# Patient Record
Sex: MALE | Race: WHITE | Employment: OTHER | URBAN - METROPOLITAN AREA
[De-identification: names, ages, dates, MRNs, and addresses within clinical notes are randomized per-mention and may not be internally consistent; named-entity substitution may affect disease eponyms.]

---

## 2017-01-28 ENCOUNTER — APPOINTMENT (OUTPATIENT)
Dept: CT IMAGING | Age: 82
End: 2017-01-28
Attending: EMERGENCY MEDICINE
Payer: MEDICARE

## 2017-01-28 ENCOUNTER — APPOINTMENT (OUTPATIENT)
Dept: GENERAL RADIOLOGY | Age: 82
End: 2017-01-28
Attending: EMERGENCY MEDICINE
Payer: MEDICARE

## 2017-01-28 ENCOUNTER — HOSPITAL ENCOUNTER (OUTPATIENT)
Age: 82
Setting detail: OBSERVATION
Discharge: NURSING, REHAB OR LONG-TERM CARE | End: 2017-01-30
Attending: EMERGENCY MEDICINE | Admitting: INTERNAL MEDICINE
Payer: MEDICARE

## 2017-01-28 DIAGNOSIS — K52.9 GASTROENTERITIS: ICD-10-CM

## 2017-01-28 DIAGNOSIS — N18.9 ACUTE ON CHRONIC RENAL INSUFFICIENCY: Primary | ICD-10-CM

## 2017-01-28 DIAGNOSIS — N28.9 ACUTE ON CHRONIC RENAL INSUFFICIENCY: Primary | ICD-10-CM

## 2017-01-28 DIAGNOSIS — R05.9 COUGH: ICD-10-CM

## 2017-01-28 LAB
ALBUMIN SERPL BCP-MCNC: 3.1 G/DL (ref 3.5–5)
ALBUMIN/GLOB SERPL: 0.7 {RATIO} (ref 1.1–2.2)
ALP SERPL-CCNC: 76 U/L (ref 45–117)
ALT SERPL-CCNC: 28 U/L (ref 12–78)
ANION GAP BLD CALC-SCNC: 8 MMOL/L (ref 5–15)
AST SERPL W P-5'-P-CCNC: 20 U/L (ref 15–37)
BASOPHILS # BLD AUTO: 0 K/UL (ref 0–0.1)
BASOPHILS # BLD: 0 % (ref 0–1)
BILIRUB SERPL-MCNC: 0.4 MG/DL (ref 0.2–1)
BUN SERPL-MCNC: 47 MG/DL (ref 6–20)
BUN/CREAT SERPL: 31 (ref 12–20)
CALCIUM SERPL-MCNC: 9.4 MG/DL (ref 8.5–10.1)
CHLORIDE SERPL-SCNC: 104 MMOL/L (ref 97–108)
CO2 SERPL-SCNC: 28 MMOL/L (ref 21–32)
CREAT SERPL-MCNC: 1.52 MG/DL (ref 0.7–1.3)
DIFFERENTIAL METHOD BLD: ABNORMAL
EOSINOPHIL # BLD: 0.8 K/UL (ref 0–0.4)
EOSINOPHIL NFR BLD: 6 % (ref 0–7)
ERYTHROCYTE [DISTWIDTH] IN BLOOD BY AUTOMATED COUNT: 15.3 % (ref 11.5–14.5)
GLOBULIN SER CALC-MCNC: 4.5 G/DL (ref 2–4)
GLUCOSE SERPL-MCNC: 113 MG/DL (ref 65–100)
HCT VFR BLD AUTO: 35.2 % (ref 36.6–50.3)
HGB BLD-MCNC: 11.2 G/DL (ref 12.1–17)
LIPASE SERPL-CCNC: 95 U/L (ref 73–393)
LYMPHOCYTES # BLD AUTO: 4 % (ref 12–49)
LYMPHOCYTES # BLD: 0.6 K/UL (ref 0.8–3.5)
MCH RBC QN AUTO: 30.9 PG (ref 26–34)
MCHC RBC AUTO-ENTMCNC: 31.8 G/DL (ref 30–36.5)
MCV RBC AUTO: 97.2 FL (ref 80–99)
MONOCYTES # BLD: 1.5 K/UL (ref 0–1)
MONOCYTES NFR BLD AUTO: 11 % (ref 5–13)
NEUTS SEG # BLD: 11 K/UL (ref 1.8–8)
NEUTS SEG NFR BLD AUTO: 79 % (ref 32–75)
PLATELET # BLD AUTO: 232 K/UL (ref 150–400)
POTASSIUM SERPL-SCNC: 3.6 MMOL/L (ref 3.5–5.1)
PROT SERPL-MCNC: 7.6 G/DL (ref 6.4–8.2)
RBC # BLD AUTO: 3.62 M/UL (ref 4.1–5.7)
RBC MORPH BLD: ABNORMAL
RBC MORPH BLD: ABNORMAL
SODIUM SERPL-SCNC: 140 MMOL/L (ref 136–145)
TROPONIN I SERPL-MCNC: <0.04 NG/ML
WBC # BLD AUTO: 13.9 K/UL (ref 4.1–11.1)

## 2017-01-28 PROCEDURE — 96374 THER/PROPH/DIAG INJ IV PUSH: CPT

## 2017-01-28 PROCEDURE — 36415 COLL VENOUS BLD VENIPUNCTURE: CPT | Performed by: EMERGENCY MEDICINE

## 2017-01-28 PROCEDURE — 71010 XR CHEST PORT: CPT

## 2017-01-28 PROCEDURE — 74011000258 HC RX REV CODE- 258: Performed by: EMERGENCY MEDICINE

## 2017-01-28 PROCEDURE — 74011250636 HC RX REV CODE- 250/636: Performed by: EMERGENCY MEDICINE

## 2017-01-28 PROCEDURE — 96375 TX/PRO/DX INJ NEW DRUG ADDON: CPT

## 2017-01-28 PROCEDURE — 80053 COMPREHEN METABOLIC PANEL: CPT | Performed by: EMERGENCY MEDICINE

## 2017-01-28 PROCEDURE — 85025 COMPLETE CBC W/AUTO DIFF WBC: CPT | Performed by: EMERGENCY MEDICINE

## 2017-01-28 PROCEDURE — 84484 ASSAY OF TROPONIN QUANT: CPT | Performed by: EMERGENCY MEDICINE

## 2017-01-28 PROCEDURE — 96361 HYDRATE IV INFUSION ADD-ON: CPT

## 2017-01-28 PROCEDURE — 83605 ASSAY OF LACTIC ACID: CPT | Performed by: EMERGENCY MEDICINE

## 2017-01-28 PROCEDURE — 93005 ELECTROCARDIOGRAM TRACING: CPT

## 2017-01-28 PROCEDURE — 83690 ASSAY OF LIPASE: CPT | Performed by: EMERGENCY MEDICINE

## 2017-01-28 PROCEDURE — 96376 TX/PRO/DX INJ SAME DRUG ADON: CPT

## 2017-01-28 PROCEDURE — 99285 EMERGENCY DEPT VISIT HI MDM: CPT

## 2017-01-28 PROCEDURE — 74177 CT ABD & PELVIS W/CONTRAST: CPT

## 2017-01-28 PROCEDURE — 74011636320 HC RX REV CODE- 636/320: Performed by: EMERGENCY MEDICINE

## 2017-01-28 RX ORDER — ONDANSETRON 2 MG/ML
4 INJECTION INTRAMUSCULAR; INTRAVENOUS
Status: COMPLETED | OUTPATIENT
Start: 2017-01-28 | End: 2017-01-28

## 2017-01-28 RX ORDER — SODIUM CHLORIDE 0.9 % (FLUSH) 0.9 %
10 SYRINGE (ML) INJECTION
Status: COMPLETED | OUTPATIENT
Start: 2017-01-28 | End: 2017-01-28

## 2017-01-28 RX ORDER — MORPHINE SULFATE 4 MG/ML
2 INJECTION, SOLUTION INTRAMUSCULAR; INTRAVENOUS ONCE
Status: COMPLETED | OUTPATIENT
Start: 2017-01-28 | End: 2017-01-28

## 2017-01-28 RX ADMIN — Medication 2 MG: at 23:17

## 2017-01-28 RX ADMIN — SODIUM CHLORIDE 500 ML: 900 INJECTION, SOLUTION INTRAVENOUS at 23:17

## 2017-01-28 RX ADMIN — Medication 10 ML: at 23:25

## 2017-01-28 RX ADMIN — SODIUM CHLORIDE 500 ML: 900 INJECTION, SOLUTION INTRAVENOUS at 22:34

## 2017-01-28 RX ADMIN — SODIUM CHLORIDE 100 ML: 900 INJECTION, SOLUTION INTRAVENOUS at 23:25

## 2017-01-28 RX ADMIN — ONDANSETRON 4 MG: 2 INJECTION INTRAMUSCULAR; INTRAVENOUS at 22:34

## 2017-01-28 RX ADMIN — IOPAMIDOL 100 ML: 755 INJECTION, SOLUTION INTRAVENOUS at 23:25

## 2017-01-28 NOTE — IP AVS SNAPSHOT
[address] [address] 
[phone] Patient: Stan Crenshaw MRN: [id number] :1918 You are allergic to the following Allergen Reactions Ciprofloxacin Nausea Only Penicillins Unknown (comments) Sulfa (Sulfonamide Antibiotics) Rash Vancomycin Rash Swelling Eyes became reddened and swollen Recent Documentation Height Weight BMI Smoking Status 1.753 m 62.5 kg 20.35 kg/m2 Former Smoker Emergency Contacts Name Discharge Info Relation Home Work Mobile Claudette Rhea  Child [2] [phone] Alex Salazar  Child [2] [phone] Astrid Salazar  Other Relative [6] [phone] About your hospitalization You were admitted on:  2017 You last received care in the:  [facility] 6S NEUR[employer] TELE You were discharged on:  2017 Unit phone number:  [phone] Why you were hospitalized Your primary diagnosis was:  Gastroenteritis Providers Seen During Your Hospitalizations Provider Role Specialty Primary office phone Eren Landers MD Attending Provider Emergency Medicine [phone] Robina Carvajal MD Attending Provider Internal Medicine [phone] Niels Miranda MD Attending Provider Internal Medicine [phone] Charlotte Cuellar MD Attending Provider Internal Medicine [phone] Your Primary Care Physician (PCP) Primary Care Physician Office Phone Office Fax Fidelina Pena 1394 [phone] Follow-up Information Follow up With Details Comments Contact Info Kelly German MD  post hospitalization in 1 week [address] [id number] 
[phone] [employer] (VETERANS ONLY)   [address] [facility] [id number] [phone] Current Discharge Medication List  
  
CONTINUE these medications which have NOT CHANGED Dose & Instructions Dispensing Information Comments Morning Noon Evening Bedtime  
 ascorbic acid (vitamin C) 500 mg tablet Commonly known as:  VITAMIN C Your next dose is: Today, Tomorrow Other:  _________ Dose:  500 mg Take 500 mg by mouth. Refills:  0  
     
   
   
   
  
 CEROVITE SENIOR Tab tablet Generic drug:  multivitamins-minerals-lutein Your next dose is: Today, Tomorrow Other:  _________ TAKE 1 TABLET BY MOUTH EVERY MORNING Quantity:  90 tablet Refills:  4  
     
   
   
   
  
 clopidogrel 75 mg Tab Commonly known as:  PLAVIX Your next dose is: Today, Tomorrow Other:  _________ Dose:  75 mg Take 1 Tab by mouth daily for 360 days. Quantity:  30 Tab Refills:  11  
     
   
   
   
  
 donepezil 10 mg tablet Commonly known as:  ARICEPT Your next dose is: Today, Tomorrow Other:  _________ TAKE 1 TABLET BY MOUTH DAILY AT BEDTIME Quantity:  90 Tab Refills:  1  
     
   
   
   
  
 ferrous sulfate 325 mg (65 mg iron) tablet Your next dose is: Today, Tomorrow Other:  _________ TAKE 1 TAB BY MOUTH TWO (2) TIMES A DAY. Quantity:  180 Tab Refills:  1  
     
   
   
   
  
 guaiFENesin-codeine 100-10 mg/5 mL solution Commonly known as:  ROBITUSSIN AC Your next dose is: Today, Tomorrow Other:  _________ Dose:  10 mL Take 10 mL by mouth three (3) times daily as needed for Cough. Refills:  0  
     
   
   
   
  
 levothyroxine 88 mcg tablet Commonly known as:  SYNTHROID Your next dose is: Today, Tomorrow Other:  _________ TAKE 1 TABLET BY MOUTH EVERY MORNING BEFORE BREAKFAST OR OTHER MEDICATIONS DX:THYROID Quantity:  90 Tab Refills:  1  
     
   
   
   
  
 loratadine 10 mg Cap Your next dose is: Today, Tomorrow Other:  _________ Dose:  10 mg Take 10 mg by mouth daily. Refills:  0  
     
   
   
   
  
 nystatin 100,000 unit/gram ointment Commonly known as:  MYCOSTATIN Your next dose is: Today, Tomorrow Other:  _________ Apply  to affected area two (2) times a day. Refills:  0  
     
   
   
   
  
 pantoprazole 20 mg tablet Commonly known as:  PROTONIX Your next dose is: Today, Tomorrow Other:  _________ Dose:  20 mg Take 20 mg by mouth daily. Refills:  0 ZAZUETA MILK OF MAGNESIA 400 mg/5 mL suspension Generic drug:  magnesium hydroxide Your next dose is: Today, Tomorrow Other:  _________ Dose:  20 mL Take 20 mL by mouth daily as needed for Constipation. Refills:  0  
     
   
   
   
  
 promethazine 25 mg tablet Commonly known as:  PHENERGAN Your next dose is: Today, Tomorrow Other:  _________ Dose:  25 mg Take 25 mg by mouth every six (6) hours as needed for Nausea. Refills:  0 REMERON 15 mg tablet Generic drug:  mirtazapine Your next dose is: Today, Tomorrow Other:  _________ Dose:  15 mg Take 15 mg by mouth nightly. Refills:  0  
     
   
   
   
  
 sertraline 50 mg tablet Commonly known as:  ZOLOFT Your next dose is: Today, Tomorrow Other:  _________ Dose:  50 mg Take 50 mg by mouth daily. Refills:  0  
     
   
   
   
  
 simvastatin 20 mg tablet Commonly known as:  ZOCOR Your next dose is: Today, Tomorrow Other:  _________ Dose:  20 mg Take 20 mg by mouth nightly. Refills:  0  
     
   
   
   
  
 tamsulosin 0.4 mg capsule Commonly known as:  FLOMAX Your next dose is: Today, Tomorrow Other:  _________ Dose:  0.4 mg Take 0.4 mg by mouth daily. Refills:  0 traMADol 50 mg tablet Commonly known as:  ULTRAM  
   
Your next dose is: Today, Tomorrow Other:  _________ Dose:  50 mg Take 50 mg by mouth two (2) times daily as needed for Pain. Refills:  0  
     
   
   
   
  
 triamcinolone 0.5 % topical cream  
Commonly known as:  ARISTOCORT Your next dose is: Today, Tomorrow Other:  _________ Apply  to affected area two (2) times daily as needed for Skin Irritation. use thin layer Refills:  0  
     
   
   
   
  
 TYLENOL EXTRA STRENGTH 500 mg tablet Generic drug:  acetaminophen Your next dose is: Today, Tomorrow Other:  _________ Take  by mouth every six (6) hours as needed for Pain. Refills:  0 STOP taking these medications diphenoxylate-atropine 2.5-0.025 mg per tablet Commonly known as:  LOMOTIL Discharge Instructions Discharge Summary  
  
  
PATIENT ID: Herminio Tejeda MRN: [id number] YOB: 1918 DATE OF ADMISSION: 1/28/2017 9:29 PM   
DATE OF DISCHARGE: 1/30/2017 PRIMARY CARE PROVIDER: Lauryn Kat MD  
  
ATTENDING PHYSICIAN: Claudia Nielson MD 
DISCHARGING PROVIDER: Aida Carson NP To contact this individual call [phone] and ask the  to page. If unavailable ask to be transferred the Adult Hospitalist Department. 
  
CONSULTATIONS: IP CONSULT TO CARDIOLOGY 
  
PROCEDURES/SURGERIES: * No surgery found * 
  
ADMITTING [address]:  
Per H&P Mr. Jannette Guillen is a 80 y.o.  male who is admitted with acute gastroenteritis. Mr. Jannette Guillen was sent from the NH to the Emergency Department complaining of nausea and vomiting and diarrhea . History from the chart and ER physician, currently sleeping and not willing to provide history.  He had 3 vomiting in the last 6 hours prior to arrival and had x2 diarrhea . He also complains of body aches and having left sided CP since yesterday with associated SOB .  
  
1/30/2017 VSS, offers no complaints, denies abd pain, N/V/D. Did not have any diarrhea during hospital stay to send off studies. Pt was adequately hydrated, eating/drinking w/o problems. Spoke with pt's son, Rosamaria Layne, aware of d/c back to NH today. No need for abx.  
  
DISCHARGE DIAGNOSES / PLAN:   
  
Acute Gastroenteritis - Resolved - CT Abdomen and Pelvis: Nonobstructing right renal stone. No acute abnormality - CXR : No acute finding -IVF  
-PRN IV Antiemetic  
  
Lt side Chest pain  
- H/O CAD /Stent, he is paced on telemetry - Continue Plavix and Statin - Serial Troponin negative x2 
- evaluated by Cardiology => Would not pursue any further cardiac work-up and favor fairly conservative measures in general with this elderly male. 
   
Dehydration Lokesh Valencia : Resolved - IV Hydration  
   
Dementia - Supportive care  
  
  
PENDING TEST RESULTS:  
At the time of discharge the following test results are still pending: none 
  
FOLLOW UP APPOINTMENTS:  
Follow-up Information Follow up With Details Comments Contact Info  
  Iker Mejia MD   post hospitalization in 1 week [address] [place] [address] Way [address] [phone]  
  
  
  
ADDITIONAL CARE RECOMMENDATIONS: as above 
  
DIET: Puree diet with honey thick liquids, supplement with fortified pudding and magic cups with meals 
  
ACTIVITY: activity as tolerated 
  
WOUND CARE: none 
  
EQUIPMENT needed: none 
  
  
DISCHARGE MEDICATIONS: 
     
Current Discharge Medication List  
   
     
CONTINUE these medications which have NOT CHANGED  
  Details  
simvastatin (ZOCOR) 20 mg tablet Take 20 mg by mouth nightly.  
   
triamcinolone (ARISTOCORT) 0.5 % topical cream Apply to affected area two (2) times daily as needed for Skin Irritation.  use thin layer  
   
 levothyroxine (SYNTHROID) 88 mcg tablet TAKE 1 TABLET BY MOUTH EVERY MORNING BEFORE BREAKFAST OR OTHER MEDICATIONS DX:THYROID Qty: 90 Tab, Refills: 1  
   
ferrous sulfate 325 mg (65 mg iron) tablet TAKE 1 TAB BY MOUTH TWO (2) TIMES A DAY. Qty: 180 Tab, Refills: 1  
   
CEROVITE SENIOR tab tablet TAKE 1 TABLET BY MOUTH EVERY MORNING Qty: 90 tablet, Refills: 4  
   
clopidogrel (PLAVIX) 75 mg tablet Take 1 Tab by mouth daily for 360 days. Qty: 30 Tab, Refills: 11  
   
nystatin (MYCOSTATIN) 100,000 unit/gram ointment Apply to affected area two (2) times a day.  
   
acetaminophen (TYLENOL EXTRA STRENGTH) 500 mg tablet Take by mouth every six (6) hours as needed for Pain.  
   
ascorbic acid, vitamin C, (VITAMIN C) 500 mg tablet Take 500 mg by mouth.  
   
mirtazapine (REMERON) 15 mg tablet Take 15 mg by mouth nightly.  
   
promethazine (PHENERGAN) 25 mg tablet Take 25 mg by mouth every six (6) hours as needed for Nausea.  
   
guaiFENesin-codeine (ROBITUSSIN AC) 100-10 mg/5 mL solution Take 10 mL by mouth three (3) times daily as needed for Cough.  
   
magnesium hydroxide (ZAZUETA MILK OF MAGNESIA) 400 mg/5 mL suspension Take 20 mL by mouth daily as needed for Constipation.  
   
loratadine 10 mg cap Take 10 mg by mouth daily.  
   
pantoprazole (PROTONIX) 20 mg tablet Take 20 mg by mouth daily.  
   
traMADol (ULTRAM) 50 mg tablet Take 50 mg by mouth two (2) times daily as needed for Pain.  
   
tamsulosin (FLOMAX) 0.4 mg capsule Take 0.4 mg by mouth daily.  
   
sertraline (ZOLOFT) 50 mg tablet Take 50 mg by mouth daily.  
   
donepezil (ARICEPT) 10 mg tablet TAKE 1 TABLET BY MOUTH DAILY AT BEDTIME Qty: 90 Tab, Refills: 1  
   
   
    
STOP taking these medications  
   
  diphenoxylate-atropine (LOMOTIL) 2.5-0.025 mg per tablet Comments:  
Reason for Stopping:   
     
   
  
  
  
NOTIFY YOUR PHYSICIAN FOR ANY OF THE FOLLOWING:  
Fever over 101 degrees for 24 hours. Chest pain, shortness of breath, fever, chills, nausea, vomiting, diarrhea, change in mentation, falling, weakness, bleeding. Severe pain or pain not relieved by medications. Or, any other signs or symptoms that you may have questions about. 
  
DISPOSITION: 
   Home With: 
  OT   PT   HH   RN  
  
xx Long term SNF/Inpatient Rehab  
  Independent/assisted living  
  Hospice  
  Other:  
  
  
PATIENT CONDITION AT DISCHARGE:  
  
Functional status  
  Poor   
xx Deconditioned   
  Independent   
  
Cognition  
   Lucid   
  Forgetful   
xx Dementia   
  
Catheters/lines (plus indication)  
  George   
  PICC   
  PEG   
xx None   
  
Code status  
xx Full code   
  DNR   
  
PHYSICAL EXAMINATION AT DISCHARGE: 
Constitutional: No acute distress, pleasant   
ENT: Oral mucous moist, oropharynx benign. Neck supple Resp: CTA bilaterally. No wheezing/rhonchi/rales. No accessory muscle use CV: Regular rhythm, normal rate, no murmurs, gallops, rubs GI: Soft, non distended, non tender. normoactive bowel sounds, no hepatosplenomegaly Musculoskeletal: No edema, warm, 2+ pulses throughout Neurologic: Moves all extremities. AA&Ox2 Psych: no anxietyagitation  
  
     
     
     
     
     
     
     
CHRONIC MEDICAL DIAGNOSES: 
Problem List as of 1/30/2017  Date Reviewed: 1/30/2017  
          Codes Class Noted - Resolved  
  UTI (lower urinary tract infection) ICD-10-CM: N39.0 ICD-9-CM: 599.0   4/11/2015 - Present  
     
  Encephalopathy ICD-10-CM: G93.40 ICD-9-CM: 348.30   4/11/2015 - Present  
     
  Altered mental status ICD-10-CM: R41.82 
ICD-9-CM: 780.97   11/19/2014 - Present  
     
  Mild cognitive impairment ICD-10-CM: G31.84 ICD-9-CM: 331.83   12/12/2013 - Present  
     
  Chronic diarrhea ICD-10-CM: K52.9 ICD-9-CM: 787.91   6/18/2012 - Present  
     
  Hypertension ICD-10-CM: I10 
ICD-9-CM: 560. 9   2/1/2012 - Present  
     
  Hypothyroidism ICD-10-CM: E03.9 ICD-9-CM: 263. 9   8/23/2011 - Present  
     
  GERD (gastroesophageal reflux disease) ICD-10-CM: K21.9 ICD-9-CM: 530.81   7/10/2011 - Present  
     
  Other and unspecified hyperlipidemia ICD-10-CM: E78.5 ICD-9-CM: 272.4   3/29/2011 - Present  
     
  BPH (benign prostatic hypertrophy) ICD-10-CM: N40.0 ICD-9-CM: 600.00   3/29/2011 - Present  
     
  Depression ICD-10-CM: F32.9 ICD-9-CM: 311   3/29/2011 - Present  
     
  Sciatica ICD-10-CM: M54.30 ICD-9-CM: 733. 3   3/29/2011 - Present  
     
  Sleep apnea ICD-10-CM: G47.30 ICD-9-CM: 780.57   3/29/2011 - Present  
     
  Eczema ICD-10-CM: L30.9 ICD-9-CM: 692.9   3/29/2011 - Present  
     
  BCC (basal cell carcinoma of skin) ICD-10-CM: C44.91 
ICD-9-CM: 173.91   3/29/2011 - Present  
     
  Vitamin D deficiency ICD-10-CM: E55.9 ICD-9-CM: 754. 9   3/29/2011 - Present  
     
  Osteoporosis ICD-10-CM: M81.0 ICD-9-CM: 733.00   3/29/2011 - Present  
     
  Restless leg syndrome ICD-10-CM: G25.81 ICD-9-CM: 333.94   3/29/2011 - Present  
     
  DVT (deep venous thrombosis) (HCC) ICD-10-CM: I82.409 ICD-9-CM: 453.40   3/29/2011 - Present  
     
  CAD (coronary artery disease) ICD-10-CM: I25.10 ICD-9-CM: 414.00   12/30/2010 - Present  
     
  Dementia ICD-10-CM: F03.90 ICD-9-CM: 294.20   12/30/2010 - Present  
     
  * (Principal)RESOLVED: Gastroenteritis ICD-10-CM: K52.9 ICD-9-CM: 558. 9   1/29/2017 - 1/30/2017  
     
  RESOLVED: Acute encephalopathy ICD-10-CM: G93.40 ICD-9-CM: 348.30   12/9/2016 - 12/13/2016  
     
  RESOLVED: Hypotension ICD-10-CM: I95.9 ICD-9-CM: 377. 9   5/11/2013 - 3/27/2014  
     
  RESOLVED: Dementia ICD-10-CM: F03.90 ICD-9-CM: 294.20   3/29/2011 - 7/28/2012  
     
  
  
  
Greater than 35 minutes were spent with the patient on counseling and coordination of care 
  
Signed:  
Susu Luo NP 
1/30/2017 
1:42 PM 
 
 
Discharge Orders None [facility] Announcement We are excited to announce that we are making your provider's discharge notes available to you in [facility]. You will see these notes when they are completed and signed by the physician that discharged you from your recent hospital stay. If you have any questions or concerns about any information you see in [facility], please call the Health Information Department where you were seen or reach out to your Primary Care Provider for more information about your plan of care. Introducing [facility] & HEALTH SERVICES! Dear Angelique Engel: Thank you for requesting a [facility] account. Our records indicate that you already have an active [facility] account. You can access your account anytime at [email or web address] [place]. [employer] [place] Did you know that you can access your hospital and ER discharge instructions at any time in [facility]? You can also review all of your test results from your hospital stay or ER visit. Additional Information If you have questions, please visit the Frequently Asked Questions section of the [facility] website at [email or web address]. [employer] [place]/. Remember, [facility] is NOT to be used for urgent needs. For medical emergencies, dial 911. Now available from your iPhone and Android! General Information Please provide this summary of care documentation to your next provider. Patient Signature:  ____________________________________________________________ Date:  ____________________________________________________________  
  
sAhely Delacruzer Provider Signature:  ____________________________________________________________ Date:  ____________________________________________________________

## 2017-01-28 NOTE — IP AVS SNAPSHOT
Current Discharge Medication List  
  
Take these medications at their scheduled times Dose & Instructions Dispensing Information Comments Morning Noon Evening Bedtime  
 clopidogrel 75 mg Tab Commonly known as:  PLAVIX Your next dose is: Today, Tomorrow Other:  ____________ Dose:  75 mg Take 1 Tab by mouth daily for 360 days. Quantity:  30 Tab Refills:  11  
     
   
   
   
  
 loratadine 10 mg Cap Your next dose is: Today, Tomorrow Other:  ____________ Dose:  10 mg Take 10 mg by mouth daily. Refills:  0  
     
   
   
   
  
 nystatin 100,000 unit/gram ointment Commonly known as:  MYCOSTATIN Your next dose is: Today, Tomorrow Other:  ____________ Apply  to affected area two (2) times a day. Refills:  0  
     
   
   
   
  
 pantoprazole 20 mg tablet Commonly known as:  PROTONIX Your next dose is: Today, Tomorrow Other:  ____________ Dose:  20 mg Take 20 mg by mouth daily. Refills:  0 REMERON 15 mg tablet Generic drug:  mirtazapine Your next dose is: Today, Tomorrow Other:  ____________ Dose:  15 mg Take 15 mg by mouth nightly. Refills:  0  
     
   
   
   
  
 sertraline 50 mg tablet Commonly known as:  ZOLOFT Your next dose is: Today, Tomorrow Other:  ____________ Dose:  50 mg Take 50 mg by mouth daily. Refills:  0  
     
   
   
   
  
 simvastatin 20 mg tablet Commonly known as:  ZOCOR Your next dose is: Today, Tomorrow Other:  ____________ Dose:  20 mg Take 20 mg by mouth nightly. Refills:  0  
     
   
   
   
  
 tamsulosin 0.4 mg capsule Commonly known as:  FLOMAX Your next dose is: Today, Tomorrow Other:  ____________ Dose:  0.4 mg Take 0.4 mg by mouth daily. Refills:  0 Take these medications as needed Dose & Instructions Dispensing Information Comments Morning Noon Evening Bedtime  
 guaiFENesin-codeine 100-10 mg/5 mL solution Commonly known as:  ROBITUSSIN AC Your next dose is: Today, Tomorrow Other:  ____________ Dose:  10 mL Take 10 mL by mouth three (3) times daily as needed for Cough. Refills:  0 ZAZUETA MILK OF MAGNESIA 400 mg/5 mL suspension Generic drug:  magnesium hydroxide Your next dose is: Today, Tomorrow Other:  ____________ Dose:  20 mL Take 20 mL by mouth daily as needed for Constipation. Refills:  0  
     
   
   
   
  
 promethazine 25 mg tablet Commonly known as:  PHENERGAN Your next dose is: Today, Tomorrow Other:  ____________ Dose:  25 mg Take 25 mg by mouth every six (6) hours as needed for Nausea. Refills:  0  
     
   
   
   
  
 traMADol 50 mg tablet Commonly known as:  ULTRAM  
   
Your next dose is: Today, Tomorrow Other:  ____________ Dose:  50 mg Take 50 mg by mouth two (2) times daily as needed for Pain. Refills:  0  
     
   
   
   
  
 triamcinolone 0.5 % topical cream  
Commonly known as:  ARISTOCORT Your next dose is: Today, Tomorrow Other:  ____________ Apply  to affected area two (2) times daily as needed for Skin Irritation. use thin layer Refills:  0  
     
   
   
   
  
 TYLENOL EXTRA STRENGTH 500 mg tablet Generic drug:  acetaminophen Your next dose is: Today, Tomorrow Other:  ____________ Take  by mouth every six (6) hours as needed for Pain. Refills:  0 Take these medications as directed Dose & Instructions Dispensing Information Comments Morning Noon Evening Bedtime  
 ascorbic acid (vitamin C) 500 mg tablet Commonly known as:  VITAMIN C Your next dose is: Today, Tomorrow Other:  ____________ Dose:  500 mg Take 500 mg by mouth. Refills:  0  
     
   
   
   
  
 CEROVITE SENIOR Tab tablet Generic drug:  multivitamins-minerals-lutein Your next dose is: Today, Tomorrow Other:  ____________ TAKE 1 TABLET BY MOUTH EVERY MORNING Quantity:  90 tablet Refills:  4  
     
   
   
   
  
 donepezil 10 mg tablet Commonly known as:  ARICEPT Your next dose is: Today, Tomorrow Other:  ____________ TAKE 1 TABLET BY MOUTH DAILY AT BEDTIME Quantity:  90 Tab Refills:  1  
     
   
   
   
  
 ferrous sulfate 325 mg (65 mg iron) tablet Your next dose is: Today, Tomorrow Other:  ____________ TAKE 1 TAB BY MOUTH TWO (2) TIMES A DAY. Quantity:  180 Tab Refills:  1  
     
   
   
   
  
 levothyroxine 88 mcg tablet Commonly known as:  SYNTHROID Your next dose is: Today, Tomorrow Other:  ____________ TAKE 1 TABLET BY MOUTH EVERY MORNING BEFORE BREAKFAST OR OTHER MEDICATIONS DX:THYROID Quantity:  90 Tab Refills:  1

## 2017-01-29 PROBLEM — K52.9 GASTROENTERITIS: Status: ACTIVE | Noted: 2017-01-29

## 2017-01-29 LAB
APPEARANCE UR: CLEAR
ATRIAL RATE: 94 BPM
BACTERIA URNS QL MICRO: NEGATIVE /HPF
BILIRUB UR QL: NEGATIVE
CALCULATED P AXIS, ECG09: 30 DEGREES
CALCULATED R AXIS, ECG10: -69 DEGREES
CALCULATED T AXIS, ECG11: 96 DEGREES
CAOX CRY URNS QL MICRO: ABNORMAL
COLOR UR: ABNORMAL
DIAGNOSIS, 93000: NORMAL
EPITH CASTS URNS QL MICRO: ABNORMAL /LPF
GLUCOSE UR STRIP.AUTO-MCNC: NEGATIVE MG/DL
HGB UR QL STRIP: ABNORMAL
HYALINE CASTS URNS QL MICRO: ABNORMAL /LPF (ref 0–5)
KETONES UR QL STRIP.AUTO: NEGATIVE MG/DL
LACTATE SERPL-SCNC: 1.2 MMOL/L (ref 0.4–2)
LEUKOCYTE ESTERASE UR QL STRIP.AUTO: ABNORMAL
NITRITE UR QL STRIP.AUTO: NEGATIVE
P-R INTERVAL, ECG05: 156 MS
PH UR STRIP: 5.5 [PH] (ref 5–8)
PROT UR STRIP-MCNC: 30 MG/DL
Q-T INTERVAL, ECG07: 438 MS
QRS DURATION, ECG06: 164 MS
QTC CALCULATION (BEZET), ECG08: 547 MS
RBC #/AREA URNS HPF: ABNORMAL /HPF (ref 0–5)
SP GR UR REFRACTOMETRY: 1.03 (ref 1–1.03)
TROPONIN I SERPL-MCNC: <0.04 NG/ML
UA: UC IF INDICATED,UAUC: ABNORMAL
UROBILINOGEN UR QL STRIP.AUTO: 0.2 EU/DL (ref 0.2–1)
VENTRICULAR RATE, ECG03: 94 BPM
WBC URNS QL MICRO: ABNORMAL /HPF (ref 0–4)

## 2017-01-29 PROCEDURE — 99218 HC RM OBSERVATION: CPT

## 2017-01-29 PROCEDURE — 74011250637 HC RX REV CODE- 250/637: Performed by: INTERNAL MEDICINE

## 2017-01-29 PROCEDURE — 96365 THER/PROPH/DIAG IV INF INIT: CPT

## 2017-01-29 PROCEDURE — 96366 THER/PROPH/DIAG IV INF ADDON: CPT

## 2017-01-29 PROCEDURE — 81001 URINALYSIS AUTO W/SCOPE: CPT | Performed by: EMERGENCY MEDICINE

## 2017-01-29 PROCEDURE — 74011000250 HC RX REV CODE- 250: Performed by: INTERNAL MEDICINE

## 2017-01-29 PROCEDURE — 74011250636 HC RX REV CODE- 250/636: Performed by: INTERNAL MEDICINE

## 2017-01-29 PROCEDURE — 74011250636 HC RX REV CODE- 250/636: Performed by: EMERGENCY MEDICINE

## 2017-01-29 PROCEDURE — 96361 HYDRATE IV INFUSION ADD-ON: CPT

## 2017-01-29 PROCEDURE — 36415 COLL VENOUS BLD VENIPUNCTURE: CPT | Performed by: INTERNAL MEDICINE

## 2017-01-29 PROCEDURE — 84484 ASSAY OF TROPONIN QUANT: CPT | Performed by: INTERNAL MEDICINE

## 2017-01-29 PROCEDURE — 77010033678 HC OXYGEN DAILY

## 2017-01-29 PROCEDURE — 77030011943

## 2017-01-29 PROCEDURE — 96372 THER/PROPH/DIAG INJ SC/IM: CPT

## 2017-01-29 RX ORDER — DONEPEZIL HYDROCHLORIDE 10 MG/1
10 TABLET, FILM COATED ORAL
Status: CANCELLED | OUTPATIENT
Start: 2017-01-29

## 2017-01-29 RX ORDER — NYSTATIN 100000 U/G
OINTMENT TOPICAL 2 TIMES DAILY
COMMUNITY

## 2017-01-29 RX ORDER — ONDANSETRON 2 MG/ML
4 INJECTION INTRAMUSCULAR; INTRAVENOUS
Status: COMPLETED | OUTPATIENT
Start: 2017-01-29 | End: 2017-01-29

## 2017-01-29 RX ORDER — PANTOPRAZOLE SODIUM 20 MG/1
20 TABLET, DELAYED RELEASE ORAL DAILY
Status: CANCELLED | OUTPATIENT
Start: 2017-01-29

## 2017-01-29 RX ORDER — SERTRALINE HYDROCHLORIDE 50 MG/1
50 TABLET, FILM COATED ORAL DAILY
Status: CANCELLED | OUTPATIENT
Start: 2017-01-29

## 2017-01-29 RX ORDER — SODIUM CHLORIDE 0.9 % (FLUSH) 0.9 %
5-10 SYRINGE (ML) INJECTION AS NEEDED
Status: DISCONTINUED | OUTPATIENT
Start: 2017-01-29 | End: 2017-01-30 | Stop reason: HOSPADM

## 2017-01-29 RX ORDER — ONDANSETRON 2 MG/ML
4 INJECTION INTRAMUSCULAR; INTRAVENOUS
Status: CANCELLED | OUTPATIENT
Start: 2017-01-29

## 2017-01-29 RX ORDER — CLOPIDOGREL BISULFATE 75 MG/1
75 TABLET ORAL DAILY
Status: DISCONTINUED | OUTPATIENT
Start: 2017-01-29 | End: 2017-01-30 | Stop reason: HOSPADM

## 2017-01-29 RX ORDER — MIRTAZAPINE 15 MG/1
15 TABLET, FILM COATED ORAL
Status: CANCELLED | OUTPATIENT
Start: 2017-01-29

## 2017-01-29 RX ORDER — TAMSULOSIN HYDROCHLORIDE 0.4 MG/1
0.4 CAPSULE ORAL DAILY
Status: CANCELLED | OUTPATIENT
Start: 2017-01-29

## 2017-01-29 RX ORDER — SODIUM CHLORIDE 0.9 % (FLUSH) 0.9 %
5-10 SYRINGE (ML) INJECTION EVERY 8 HOURS
Status: DISCONTINUED | OUTPATIENT
Start: 2017-01-29 | End: 2017-01-30 | Stop reason: HOSPADM

## 2017-01-29 RX ORDER — METRONIDAZOLE 500 MG/100ML
500 INJECTION, SOLUTION INTRAVENOUS EVERY 8 HOURS
Status: DISCONTINUED | OUTPATIENT
Start: 2017-01-29 | End: 2017-01-30 | Stop reason: HOSPADM

## 2017-01-29 RX ORDER — SIMVASTATIN 20 MG/1
20 TABLET, FILM COATED ORAL
Status: DISCONTINUED | OUTPATIENT
Start: 2017-01-29 | End: 2017-01-30 | Stop reason: HOSPADM

## 2017-01-29 RX ORDER — HEPARIN SODIUM 5000 [USP'U]/ML
5000 INJECTION, SOLUTION INTRAVENOUS; SUBCUTANEOUS EVERY 8 HOURS
Status: DISCONTINUED | OUTPATIENT
Start: 2017-01-29 | End: 2017-01-30 | Stop reason: HOSPADM

## 2017-01-29 RX ORDER — SODIUM CHLORIDE 9 MG/ML
100 INJECTION, SOLUTION INTRAVENOUS CONTINUOUS
Status: DISCONTINUED | OUTPATIENT
Start: 2017-01-29 | End: 2017-01-30 | Stop reason: HOSPADM

## 2017-01-29 RX ORDER — ACETAMINOPHEN 500 MG
TABLET ORAL
COMMUNITY

## 2017-01-29 RX ORDER — ACETAMINOPHEN 325 MG/1
650 TABLET ORAL
Status: DISCONTINUED | OUTPATIENT
Start: 2017-01-29 | End: 2017-01-30 | Stop reason: HOSPADM

## 2017-01-29 RX ORDER — LEVOTHYROXINE SODIUM 88 UG/1
88 TABLET ORAL
Status: DISCONTINUED | OUTPATIENT
Start: 2017-01-29 | End: 2017-01-30 | Stop reason: HOSPADM

## 2017-01-29 RX ADMIN — Medication 10 ML: at 17:11

## 2017-01-29 RX ADMIN — CLOPIDOGREL BISULFATE 75 MG: 75 TABLET ORAL at 11:23

## 2017-01-29 RX ADMIN — SODIUM CHLORIDE 100 ML/HR: 900 INJECTION, SOLUTION INTRAVENOUS at 17:11

## 2017-01-29 RX ADMIN — Medication 10 ML: at 23:27

## 2017-01-29 RX ADMIN — LEVOTHYROXINE SODIUM 88 MCG: 88 TABLET ORAL at 11:23

## 2017-01-29 RX ADMIN — HEPARIN SODIUM 5000 UNITS: 5000 INJECTION, SOLUTION INTRAVENOUS; SUBCUTANEOUS at 20:28

## 2017-01-29 RX ADMIN — METRONIDAZOLE 500 MG: 500 INJECTION, SOLUTION INTRAVENOUS at 17:11

## 2017-01-29 RX ADMIN — SIMVASTATIN 20 MG: 20 TABLET, FILM COATED ORAL at 23:27

## 2017-01-29 RX ADMIN — HEPARIN SODIUM 5000 UNITS: 5000 INJECTION, SOLUTION INTRAVENOUS; SUBCUTANEOUS at 11:23

## 2017-01-29 RX ADMIN — METRONIDAZOLE 500 MG: 500 INJECTION, SOLUTION INTRAVENOUS at 11:23

## 2017-01-29 RX ADMIN — SODIUM CHLORIDE 100 ML/HR: 900 INJECTION, SOLUTION INTRAVENOUS at 04:48

## 2017-01-29 RX ADMIN — ACETAMINOPHEN 650 MG: 325 TABLET, FILM COATED ORAL at 20:22

## 2017-01-29 RX ADMIN — ONDANSETRON 4 MG: 2 INJECTION INTRAMUSCULAR; INTRAVENOUS at 01:30

## 2017-01-29 NOTE — ED NOTES
Spoke with Artist JANUARY Valdivia from [facility] to update them on pt's care and pending labs.

## 2017-01-29 NOTE — ED PROVIDER NOTES
HPI Comments: The patient presents to the ED with n/v/d and generalized pain. Symptoms began today. He has vomited a few times. No blood in vomit. He has had 2 episodes of non-bloody diarrhea. He denies any fever. He has severe nausea and \"hurts all over. \" Pain is moderate, 5/10 and aching. He denies any hematuria or dysuria. He also has mild left chest pain and shortness of breath which began yesterday. He states he has had cough \"for a while. \" No meds taken prior to arrival.     Patient is a 80 y.o. male presenting with vomiting and diarrhea. The history is provided by the patient. Vomiting    Associated symptoms include abdominal pain, diarrhea, myalgias and cough. Pertinent negatives include no fever, no headaches and no headaches. Diarrhea    Associated symptoms include diarrhea, nausea, vomiting, myalgias and chest pain. Pertinent negatives include no fever, no dysuria and no headaches. Past Medical History:   Diagnosis Date    BCC (basal cell carcinoma of skin)     BPH (benign prostatic hypertrophy)     CAD (coronary artery disease)      3 stents and 1 baloon    Dementia     Hypertension     Other and unspecified hyperlipidemia     Overactive bladder     Pacemaker     Psychiatric disorder     Sciatica        Past Surgical History:   Procedure Laterality Date    Hx pacemaker      Pr cardiac surg procedure unlist       stent placement         Family History:   Problem Relation Age of Onset    Cancer Father        Social History     Social History    Marital status:      Spouse name: N/A    Number of children: N/A    Years of education: N/A     Occupational History    Not on file.      Social History Main Topics    Smoking status: Former Smoker    Smokeless tobacco: Never Used    Alcohol use Yes      Comment: occasional beer/wine    Drug use: No    Sexual activity: No     Other Topics Concern    Not on file     Social History Narrative         ALLERGIES: Ciprofloxacin; Penicillins; Sulfa (sulfonamide antibiotics); and Vancomycin    Review of Systems   Constitutional: Negative for appetite change and fever. HENT: Negative for congestion, nosebleeds and sore throat. Eyes: Negative for discharge. Respiratory: Positive for cough and shortness of breath. Cardiovascular: Positive for chest pain. Gastrointestinal: Positive for abdominal pain, diarrhea, nausea and vomiting. Genitourinary: Negative for dysuria. Musculoskeletal: Positive for myalgias. Skin: Negative for rash. Neurological: Negative for weakness and headaches. Hematological: Negative for adenopathy. Psychiatric/Behavioral: Negative. All other systems reviewed and are negative. Vitals:    01/28/17 2136 01/28/17 2200 01/28/17 2230   BP: 139/53 115/66 120/81   Pulse: 93 97 92   Resp: 16 29 22   Temp: 98 °F (36.7 °C)     SpO2: 94% 92% 94%   Weight: 63.5 kg (140 lb)     Height: 5' 9\" (1.753 m)              Physical Exam   Constitutional: He is oriented to person, place, and time. He appears well-developed and well-nourished. HENT:   Head: Normocephalic and atraumatic. Mouth/Throat: Oropharynx is clear and moist.   Eyes: Conjunctivae and EOM are normal. Pupils are equal, round, and reactive to light. Neck: Normal range of motion. Neck supple. Cardiovascular: Normal rate, regular rhythm and normal heart sounds. Pulmonary/Chest: Effort normal.   Slightly coarse throughout. Abdominal: Soft. Bowel sounds are normal. There is no tenderness. Musculoskeletal: Normal range of motion. He exhibits no edema or tenderness. Neurological: He is alert and oriented to person, place, and time. No cranial nerve deficit. Coordination normal.   Grossly intact. Skin: Skin is warm and dry. Psychiatric: He has a normal mood and affect. His behavior is normal.   Nursing note and vitals reviewed. [facility]  ED Course       Procedures    ED EKG interpretation:  Rhythm: paced; and regular .  Rate (approx.): 95; Axis: normal;  This EKG was interpreted by Scooter Omalley MD,ED Provider. The patient has continued nausea. 2:08 AM  CONSULT:  Dr. La Dukes - hosptialist - will come to see. A/P:  1. Acute on chronic renal insufficiency - IVF given. 2. N/V/D - suspect viral GI. Continued nausea in ED. 3. Cough - chronic per patient.

## 2017-01-29 NOTE — ROUTINE PROCESS
TRANSFER - OUT REPORT:    Verbal report given to Asif Yang RN (name) on [facility]  being transferred to NSTU (unit) for routine progression of care       Report consisted of patients Situation, Background, Assessment and   Recommendations(SBAR). Information from the following report(s) SBAR, ED Summary, MAR, Recent Results and Cardiac Rhythm Atrial paced was reviewed with the receiving nurse. Lines:   Peripheral IV 12/13/16 Left Wrist (Active)       Peripheral IV 01/28/17 Left Antecubital (Active)   Site Assessment Clean, dry, & intact 1/28/2017  9:58 PM   Phlebitis Assessment 0 1/28/2017  9:58 PM   Infiltration Assessment 0 1/28/2017  9:58 PM   Dressing Status Clean, dry, & intact 1/28/2017  9:58 PM   Dressing Type Transparent 1/28/2017  9:58 PM   Hub Color/Line Status Pink;Flushed;Patent 1/28/2017  9:58 PM   Action Taken Blood drawn 1/28/2017  9:58 PM        Opportunity for questions and clarification was provided.       Patient transported with:   Monitor  Registered Nurse

## 2017-01-29 NOTE — PROGRESS NOTES
Consult    Patient: Holger Garcia MRN: [id number]  SSN: [id number]    YOB: 1918  Age: 80 y.o. Sex: male       Subjective:      Date of  Admission: 1/28/2017     Admission type: Emergency    Holger Garcia is a 80 y.o. male admitted for Gastroenteritis. Mr Millie Porter was admitted with epigastric pain, nausea and vomiting  He mentioned having chest pain over the left side of his chest, so Cardiology was consulted. He has a history of CAD that has been stable  Dr Lj Hill placed a pacemaker several years ago.   He is not too sure where he is but knows it is a hospital.    Primary Care Provider: Patricia Donnelly MD  Past Medical History   Diagnosis Date    BCC (basal cell carcinoma of skin)     BPH (benign prostatic hypertrophy)     CAD (coronary artery disease)      3 stents and 1 baloon    Dementia     Hypertension     Other and unspecified hyperlipidemia     Overactive bladder     Pacemaker     Psychiatric disorder     Sciatica       Past Surgical History   Procedure Laterality Date    Hx pacemaker      Pr cardiac surg procedure unlist       stent placement     Family History   Problem Relation Age of Onset    Cancer Father       Social History   Substance Use Topics    Smoking status: Former Smoker    Smokeless tobacco: Never Used    Alcohol use Yes      Comment: occasional beer/wine      Current Facility-Administered Medications   Medication Dose Route Frequency    0.9% sodium chloride infusion  100 mL/hr IntraVENous CONTINUOUS    sodium chloride (NS) flush 5-10 mL  5-10 mL IntraVENous Q8H    sodium chloride (NS) flush 5-10 mL  5-10 mL IntraVENous PRN    clopidogrel (PLAVIX) tablet 75 mg  75 mg Oral DAILY    simvastatin (ZOCOR) tablet 20 mg  20 mg Oral QHS    levothyroxine (SYNTHROID) tablet 88 mcg  88 mcg Oral ACB    prochlorperazine (COMPAZINE) with saline injection 5 mg  5 mg IntraVENous Q4H PRN    metroNIDAZOLE (FLAGYL) IVPB premix 500 mg  500 mg IntraVENous Q8H    heparin (porcine) injection 5,000 Units  5,000 Units SubCUTAneous Q8H        Allergies   Allergen Reactions    Ciprofloxacin Nausea Only    Penicillins Unknown (comments)    Sulfa (Sulfonamide Antibiotics) Rash    Vancomycin Rash and Swelling     Eyes became reddened and swollen          Review of Systems:  Review of systems not obtained due to patient factors. Subjective:          Physical Exam:  Visit Vitals    /53 (BP 1 Location: Right arm, BP Patient Position: At rest)    Pulse 82    Temp 97.5 °F (36.4 °C)    Resp 19    Ht 5' 9\" (1.753 m)    Wt 63 kg (138 lb 14.4 oz)    SpO2 99%    BMI 20.51 kg/m2     General Appearance:  Frail elderly male, alert and partially oriented, and individual in no acute distress. Ears/Nose/Mouth/Throat:   Hearing grossly normal.         Neck: Supple. Chest:   Lungs clear to auscultation bilaterally. Cardiovascular:  Regular rate and rhythm, S1, S2 normal, no murmur. Abdomen:   Soft, non-tender, bowel sounds are active. Extremities: No edema bilaterally. Skin: Warm and dry.                Cardiographics:  Telemetry: paced rhythm  ECG: a-sensed v-paced rhythm  Echocardiogram: Not done    Data Reviewed:   BMP:   Lab Results   Component Value Date/Time     01/28/2017 09:59 PM    K 3.6 01/28/2017 09:59 PM     01/28/2017 09:59 PM    CO2 28 01/28/2017 09:59 PM    AGAP 8 01/28/2017 09:59 PM     (H) 01/28/2017 09:59 PM    BUN 47 (H) 01/28/2017 09:59 PM    CREA 1.52 (H) 01/28/2017 09:59 PM    GFRAA 52 (L) 01/28/2017 09:59 PM    GFRNA 43 (L) 01/28/2017 09:59 PM     CBC:   Lab Results   Component Value Date/Time    WBC 13.9 (H) 01/28/2017 09:59 PM    HGB 11.2 (L) 01/28/2017 09:59 PM    HCT 35.2 (L) 01/28/2017 09:59 PM     01/28/2017 09:59 PM     All Cardiac Markers in the last 24 hours:   Lab Results   Component Value Date/Time    TROIQ <0.04 01/29/2017 08:35 AM    TROIQ <0.04 01/28/2017 09:59 PM        Assessment:      1) Acute gastroenteritis    2) Mild JEANIE probably from volume depletion    3) Chest pain: a manifestation of his gastroenteritis  Troponin negative. Non-cardiac    4) CAD: Stable    5) Pacemaker - stable    6) Dementia     Plan:     Continue supportive cares  Would not pursue any further cardiac work-up and favor fairly conservative measures in general with this elderly male. Signing off since no acute cardiac issues  If specific cardiac issues arise, pls call our office.     Signed By: Danyelle Antony MD     January 29, 2017

## 2017-01-29 NOTE — ED TRIAGE NOTES
TRIAGE NOTE: Pt arrives via EMS from [facility]. for c/o N/V/D and generalized bodyaches. Per EMS, pt had 3 episodes of vomiting over the past 6 hours. Hx Afib and pacemaker.

## 2017-01-29 NOTE — ED NOTES
Spoke with Mei Kothari, pt's daughter-in-law, regarding pt's care. King Blake and Maricruz Sapp can be reached at [phone].

## 2017-01-29 NOTE — PROGRESS NOTES
Hospitalist Progress Note        Date of Service:  2017  NAME:  Ashia Ramirez  :  1918  MRN:  [id number]      Admission Summary:   HISTORY OF PRESENT ILLNESS ON ADMISSION:   \"Mr. Lili Marcus is a 80 y.o.  male who is admitted with acute gastroenteritis . Mr. Salazar was sent from the NH  to the Emergency Department complaining of nausea and vomiting and diarrhea . History from the chart and ER physician , currently sleeping and not willing to provide history . He had 3 vomiting in the last 6 hours prior to arrival and had x2 diarrhea . He also complains of body aches and having left sided CP since yesterday with associated SOB. \"     Interval history / Subjective:   No complaints but admits to frequent stooling     Assessment & Plan:     CT Abdomen and Pelvis :Nonobstructing right renal stone. No acute abnormality  CXR : No acute finding     -Acute  Gastroenteritis : CT as above   -IVF   -PRN IV Antiemetic   -Check Stool CX and C diff, already started flagyl     -Lt side Chest pain : H/O CAD /Stent, he is paced on telemetry  -Continue Plavix and Statin   -Monitor on Telemetry   -Serial Troponin negative x2  -Cardiology consulted on admission     -Dehydration Scarlet Organ :  -IV Hydration      -Dementia :  -Supportive care .     Code status: full  DVT prophylaxis: heparin       Hospital Problems  Date Reviewed: 2017          Codes Class Noted POA    * (Principal)Gastroenteritis ICD-10-CM: K52.9  ICD-9-CM: 558.9  2017 Yes                Review of Systems:   10 point ROS unable to obtain due to current state      Vital Signs:    Last 24hrs VS reviewed since prior progress note.  Most recent are:  Visit Vitals    /54 (BP 1 Location: Right arm, BP Patient Position: At rest)    Pulse 74    Temp 97.6 °F (36.4 °C)    Resp 11    Ht 5' 9\" (1.753 m)    Wt 63 kg (138 lb 14.4 oz)    SpO2 97%    BMI 20.51 kg/m2       Physical Examination: Constitutional:  No acute distress, pleasant    ENT:  Oral mucous moist, oropharynx benign. Neck supple,    Resp:  CTA bilaterally. No wheezing/rhonchi/rales. No accessory muscle use   CV:  Regular rhythm, normal rate, no murmurs, gallops, rubs    GI:  Soft, non distended, non tender. normoactive bowel sounds, no hepatosplenomegaly     Musculoskeletal:  No edema, warm, 2+ pulses throughout    Neurologic:  Moves all extremities. CN II-XII reviewed  Psych: no anxiety/angered/agitated             Data Review:         Labs:     Recent Labs      01/28/17 2159   WBC  13.9*   HGB  11.2*   HCT  35.2*   PLT  232     Recent Labs      01/28/17 2159   NA  140   K  3.6   CL  104   CO2  28   BUN  47*   CREA  1.52*   GLU  113*   CA  9.4     Recent Labs      01/28/17 2159   SGOT  20   ALT  28   AP  76   TBILI  0.4   TP  7.6   ALB  3.1*   GLOB  4.5*   LPSE  95       Lab Results   Component Value Date/Time    Folate >24.0 01/28/2010 10:14 AM      No results for input(s): PH, PCO2, PO2 in the last 72 hours.   Recent Labs      01/29/17   0835  01/28/17 2159   TROIQ  <0.04  <0.04     Lab Results   Component Value Date/Time    Cholesterol, total 185 05/21/2015 10:05 AM    HDL Cholesterol 68 05/21/2015 10:05 AM    LDL, calculated 80 05/21/2015 10:05 AM    Triglyceride 185 05/21/2015 10:05 AM    CHOL/HDL Ratio 7.0 12/30/2010 02:10 AM     Lab Results   Component Value Date/Time    Glucose (POC) 89 12/09/2016 11:29 AM    Glucose (POC) 117 04/11/2015 12:37 AM    Glucose (POC) 94 04/03/2015 12:41 PM    Glucose (POC) 81 06/21/2010 02:19 PM    Glucose (POC) 93 11/05/2009 11:39 AM     Lab Results   Component Value Date/Time    Color YELLOW/STRAW 01/29/2017 12:42 AM    Appearance CLEAR 01/29/2017 12:42 AM    Specific gravity 1.030 01/29/2017 12:42 AM    Specific gravity 1.023 12/08/2016 09:47 PM    pH (UA) 5.5 01/29/2017 12:42 AM    Protein 30 01/29/2017 12:42 AM    Glucose NEGATIVE  01/29/2017 12:42 AM    Ketone NEGATIVE 01/29/2017 12:42 AM    Bilirubin NEGATIVE  01/29/2017 12:42 AM    Urobilinogen 0.2 01/29/2017 12:42 AM    Nitrites NEGATIVE  01/29/2017 12:42 AM    Leukocyte Esterase SMALL 01/29/2017 12:42 AM    Epithelial cells FEW 01/29/2017 12:42 AM    Bacteria NEGATIVE  01/29/2017 12:42 AM    WBC 0-4 01/29/2017 12:42 AM    RBC 10-20 01/29/2017 12:42 AM                        Zhanna Rushing MD

## 2017-01-29 NOTE — H&P
Admission History and Physical      NAME:  Ramona Carbajal   :   1918   MRN:  [id number]     PCP:  Rebecca Mueller MD     Date/Time:  2017         Subjective:     CHIEF COMPLAINT: Left sided CP , Nausea , vomiting and diarrhea. HISTORY OF PRESENT ILLNESS:     Mr. Mena Mckeon is a 80 y.o.  male who is admitted with acute gastroenteritis . Mr. Salazar was sent from the NH  to the Emergency Department  complaining of nausea and vomiting and diarrhea . History from the chart and ER physician , currently sleeping and not willing to provide history . He had 3 vomiting in the last 6 hours prior to arrival and had x2 diarrhea . He also complains of body aches and having left sided CP since yesterday with associated SOB . Past Medical History   Diagnosis Date    BCC (basal cell carcinoma of skin)     BPH (benign prostatic hypertrophy)     CAD (coronary artery disease)      3 stents and 1 baloon    Dementia     Hypertension     Other and unspecified hyperlipidemia     Overactive bladder     Pacemaker     Psychiatric disorder     Sciatica         Past Surgical History   Procedure Laterality Date    Hx pacemaker      Pr cardiac surg procedure unlist       stent placement       Social History   Substance Use Topics    Smoking status: Former Smoker    Smokeless tobacco: Never Used    Alcohol use Yes      Comment: occasional beer/wine        Family History   Problem Relation Age of Onset    Cancer Father         Allergies   Allergen Reactions    Ciprofloxacin Nausea Only    Penicillins Unknown (comments)    Sulfa (Sulfonamide Antibiotics) Rash    Vancomycin Rash and Swelling     Eyes became reddened and swollen          Prior to Admission medications    Medication Sig Start Date End Date Taking? Authorizing Provider   simvastatin (ZOCOR) 20 mg tablet Take 20 mg by mouth nightly. Stas Adrian MD   ascorbic acid, vitamin C, (VITAMIN C) 500 mg tablet Take 500 mg by mouth.     Stas MD Nguyễn   mirtazapine (REMERON) 15 mg tablet Take 15 mg by mouth nightly. Stas Adrian MD   promethazine (PHENERGAN) 25 mg tablet Take 25 mg by mouth every six (6) hours as needed for Nausea. Stas Adrian MD   guaiFENesin-codeine (ROBITUSSIN AC) 100-10 mg/5 mL solution Take 10 mL by mouth three (3) times daily as needed for Cough. Stas Adrian MD   triamcinolone (ARISTOCORT) 0.5 % topical cream Apply  to affected area two (2) times daily as needed for Skin Irritation. use thin layer    Stas Adrian MD   magnesium hydroxide ([employer] [place] MILK OF MAGNESIA) 400 mg/5 mL suspension Take 20 mL by mouth daily as needed for Constipation. Stas Adrian MD   loratadine 10 mg cap Take 10 mg by mouth daily. Historical Provider   pantoprazole (PROTONIX) 20 mg tablet Take 20 mg by mouth daily. Historical Provider   traMADol (ULTRAM) 50 mg tablet Take 50 mg by mouth two (2) times daily as needed for Pain. Historical Provider   tamsulosin (FLOMAX) 0.4 mg capsule Take 0.4 mg by mouth daily. Historical Provider   sertraline (ZOLOFT) 50 mg tablet Take 50 mg by mouth daily. Historical Provider   levothyroxine (SYNTHROID) 88 mcg tablet TAKE 1 TABLET BY MOUTH EVERY MORNING BEFORE BREAKFAST OR OTHER MEDICATIONS DX:THYROID 5/6/15   Riana Headley MD   donepezil (ARICEPT) 10 mg tablet TAKE 1 TABLET BY MOUTH DAILY AT BEDTIME 4/6/15   Kika Barber NP   ferrous sulfate 325 mg (65 mg iron) tablet TAKE 1 TAB BY MOUTH TWO (2) TIMES A DAY. 2/26/15   Riana Headley MD   CEROVITE SENIOR tab tablet TAKE 1 TABLET BY MOUTH EVERY MORNING 1/27/15   Riana Headley MD   diphenoxylate-atropine (LOMOTIL) 2.5-0.025 mg per tablet Take 1 tablet by mouth daily. Historical Provider   clopidogrel (PLAVIX) 75 mg tablet Take 1 Tab by mouth daily for 360 days.  12/30/10   Cat Torrez MD         Review of Systems:  Unable to provide , sleeping          Objective:      VITALS:    Vital signs reviewed; most recent are:    Visit Vitals  /63    Pulse 86    Temp 98 °F (36.7 °C)    Resp 17    Ht 5' 9\" (1.753 m)    Wt 63.5 kg (140 lb)    SpO2 90%    BMI 20.67 kg/m2     SpO2 Readings from Last 6 Encounters:   01/29/17 90%   12/13/16 92%   04/25/16 94%   06/29/15 93%   06/25/15 94%   06/12/15 97%        No intake or output data in the 24 hours ending 01/29/17 0237         Exam:     Physical Exam:    Gen:  Well-developed, well-nourished, in no acute distress  HEENT:  Pink conjunctivae, PERRL, hearing intact to voice, slight moist mucous membranes  Neck:  Supple, No JVD  Resp:  No accessory muscle use, clear breath sounds without wheezes rales or rhonchi  Card:  No murmurs, normal S1, S2 without thrills, bruits or peripheral edema  Abd:  Soft, non-tender, non-distended, normoactive bowel sounds are present  Musc:  No cyanosis or clubbing  Skin:  No rashes ,skin turgor is good  Neuro: Follow commands , sleepy but able to arouse   Psych:  Poor insight        Labs:    Recent Labs      01/28/17   2159   WBC  13.9*   HGB  11.2*   HCT  35.2*   PLT  232     Recent Labs      01/28/17   2159   NA  140   K  3.6   CL  104   CO2  28   GLU  113*   BUN  47*   CREA  1.52*   CA  9.4   ALB  3.1*   TBILI  0.4   SGOT  20   ALT  28     Lab Results   Component Value Date/Time    Glucose (POC) 89 12/09/2016 11:29 AM    Glucose (POC) 117 04/11/2015 12:37 AM    Glucose (POC) 94 04/03/2015 12:41 PM    Glucose (POC) 81 06/21/2010 02:19 PM     Telemetry reviewed:    PACED     CT Abdomen and Pelvis :Nonobstructing right renal stone. No acute abnormality  CXR : No acute finding      Assessment/Plan:       -Acute  Gastroenteritis : CT as above      -IVF      -PRN  IV Antiemetic      -Check Stool CX and C diff     -Lt side Chest pain : H/O CAD /Stent     -Continue Plavix and Statin     -Monitor on Telemetry     -Serial Troponin     -Consult Cardiology     -Dehydration Juris Kyung :    -IV Hydration and follow up labs      -Dementia :   -Supportive care .     Total time spent with patient: [id number] Shira discussed with: [facility] physician     Discussed:  Care Plan    Probable Disposition:  Return to LTC           ___________________________________________________    Attending Physician: Flynn Sanders MD

## 2017-01-30 VITALS
RESPIRATION RATE: 21 BRPM | OXYGEN SATURATION: 96 % | WEIGHT: 137.8 LBS | TEMPERATURE: 97.5 F | HEIGHT: 69 IN | DIASTOLIC BLOOD PRESSURE: 66 MMHG | BODY MASS INDEX: 20.41 KG/M2 | SYSTOLIC BLOOD PRESSURE: 132 MMHG | HEART RATE: 77 BPM

## 2017-01-30 PROBLEM — K52.9 GASTROENTERITIS: Status: RESOLVED | Noted: 2017-01-29 | Resolved: 2017-01-30

## 2017-01-30 LAB
ALBUMIN SERPL BCP-MCNC: 2.4 G/DL (ref 3.5–5)
ALBUMIN/GLOB SERPL: 0.6 {RATIO} (ref 1.1–2.2)
ALP SERPL-CCNC: 51 U/L (ref 45–117)
ALT SERPL-CCNC: 17 U/L (ref 12–78)
ANION GAP BLD CALC-SCNC: 7 MMOL/L (ref 5–15)
AST SERPL W P-5'-P-CCNC: 14 U/L (ref 15–37)
BASOPHILS # BLD AUTO: 0.1 K/UL (ref 0–0.1)
BASOPHILS # BLD: 2 % (ref 0–1)
BILIRUB SERPL-MCNC: 0.3 MG/DL (ref 0.2–1)
BUN SERPL-MCNC: 34 MG/DL (ref 6–20)
BUN/CREAT SERPL: 31 (ref 12–20)
CALCIUM SERPL-MCNC: 8.3 MG/DL (ref 8.5–10.1)
CHLORIDE SERPL-SCNC: 114 MMOL/L (ref 97–108)
CO2 SERPL-SCNC: 25 MMOL/L (ref 21–32)
CREAT SERPL-MCNC: 1.11 MG/DL (ref 0.7–1.3)
DIFFERENTIAL METHOD BLD: ABNORMAL
EOSINOPHIL # BLD: 0.8 K/UL (ref 0–0.4)
EOSINOPHIL NFR BLD: 14 % (ref 0–7)
ERYTHROCYTE [DISTWIDTH] IN BLOOD BY AUTOMATED COUNT: 15.5 % (ref 11.5–14.5)
GLOBULIN SER CALC-MCNC: 3.7 G/DL (ref 2–4)
GLUCOSE SERPL-MCNC: 85 MG/DL (ref 65–100)
HCT VFR BLD AUTO: 27 % (ref 36.6–50.3)
HGB BLD-MCNC: 8.4 G/DL (ref 12.1–17)
LYMPHOCYTES # BLD AUTO: 20 % (ref 12–49)
LYMPHOCYTES # BLD: 1.2 K/UL (ref 0.8–3.5)
MAGNESIUM SERPL-MCNC: 1.6 MG/DL (ref 1.6–2.4)
MCH RBC QN AUTO: 31 PG (ref 26–34)
MCHC RBC AUTO-ENTMCNC: 31.1 G/DL (ref 30–36.5)
MCV RBC AUTO: 99.6 FL (ref 80–99)
MONOCYTES # BLD: 0.8 K/UL (ref 0–1)
MONOCYTES NFR BLD AUTO: 13 % (ref 5–13)
NEUTS SEG # BLD: 3 K/UL (ref 1.8–8)
NEUTS SEG NFR BLD AUTO: 51 % (ref 32–75)
PHOSPHATE SERPL-MCNC: 3.2 MG/DL (ref 2.6–4.7)
PLATELET # BLD AUTO: 180 K/UL (ref 150–400)
POTASSIUM SERPL-SCNC: 3.6 MMOL/L (ref 3.5–5.1)
PROT SERPL-MCNC: 6.1 G/DL (ref 6.4–8.2)
RBC # BLD AUTO: 2.71 M/UL (ref 4.1–5.7)
RBC MORPH BLD: ABNORMAL
RBC MORPH BLD: ABNORMAL
SODIUM SERPL-SCNC: 146 MMOL/L (ref 136–145)
WBC # BLD AUTO: 5.9 K/UL (ref 4.1–11.1)

## 2017-01-30 PROCEDURE — 83735 ASSAY OF MAGNESIUM: CPT | Performed by: INTERNAL MEDICINE

## 2017-01-30 PROCEDURE — 74011000250 HC RX REV CODE- 250: Performed by: INTERNAL MEDICINE

## 2017-01-30 PROCEDURE — 74011250636 HC RX REV CODE- 250/636: Performed by: INTERNAL MEDICINE

## 2017-01-30 PROCEDURE — 74011250637 HC RX REV CODE- 250/637: Performed by: INTERNAL MEDICINE

## 2017-01-30 PROCEDURE — 85025 COMPLETE CBC W/AUTO DIFF WBC: CPT | Performed by: INTERNAL MEDICINE

## 2017-01-30 PROCEDURE — 80053 COMPREHEN METABOLIC PANEL: CPT | Performed by: INTERNAL MEDICINE

## 2017-01-30 PROCEDURE — 96372 THER/PROPH/DIAG INJ SC/IM: CPT

## 2017-01-30 PROCEDURE — 92610 EVALUATE SWALLOWING FUNCTION: CPT | Performed by: SPEECH-LANGUAGE PATHOLOGIST

## 2017-01-30 PROCEDURE — 96366 THER/PROPH/DIAG IV INF ADDON: CPT

## 2017-01-30 PROCEDURE — 84100 ASSAY OF PHOSPHORUS: CPT | Performed by: INTERNAL MEDICINE

## 2017-01-30 PROCEDURE — 99218 HC RM OBSERVATION: CPT

## 2017-01-30 PROCEDURE — G8997 SWALLOW GOAL STATUS: HCPCS | Performed by: SPEECH-LANGUAGE PATHOLOGIST

## 2017-01-30 PROCEDURE — G8998 SWALLOW D/C STATUS: HCPCS | Performed by: SPEECH-LANGUAGE PATHOLOGIST

## 2017-01-30 PROCEDURE — G8996 SWALLOW CURRENT STATUS: HCPCS | Performed by: SPEECH-LANGUAGE PATHOLOGIST

## 2017-01-30 PROCEDURE — 36415 COLL VENOUS BLD VENIPUNCTURE: CPT | Performed by: INTERNAL MEDICINE

## 2017-01-30 RX ADMIN — HEPARIN SODIUM 5000 UNITS: 5000 INJECTION, SOLUTION INTRAVENOUS; SUBCUTANEOUS at 03:53

## 2017-01-30 RX ADMIN — LEVOTHYROXINE SODIUM 88 MCG: 88 TABLET ORAL at 08:26

## 2017-01-30 RX ADMIN — METRONIDAZOLE 500 MG: 500 INJECTION, SOLUTION INTRAVENOUS at 08:26

## 2017-01-30 RX ADMIN — Medication 10 ML: at 14:37

## 2017-01-30 RX ADMIN — HEPARIN SODIUM 5000 UNITS: 5000 INJECTION, SOLUTION INTRAVENOUS; SUBCUTANEOUS at 12:01

## 2017-01-30 RX ADMIN — METRONIDAZOLE 500 MG: 500 INJECTION, SOLUTION INTRAVENOUS at 01:09

## 2017-01-30 RX ADMIN — ACETAMINOPHEN 650 MG: 325 TABLET, FILM COATED ORAL at 12:17

## 2017-01-30 RX ADMIN — ACETAMINOPHEN 650 MG: 325 TABLET, FILM COATED ORAL at 03:53

## 2017-01-30 RX ADMIN — Medication 10 ML: at 05:33

## 2017-01-30 RX ADMIN — CLOPIDOGREL BISULFATE 75 MG: 75 TABLET ORAL at 08:26

## 2017-01-30 RX ADMIN — SODIUM CHLORIDE 100 ML/HR: 900 INJECTION, SOLUTION INTRAVENOUS at 07:12

## 2017-01-30 NOTE — PROGRESS NOTES
Speech Pathology bedside swallow evaluation/discharge  Patient: Gretchen Hernandez (11 y.o. male)  Date: 1/30/2017  Primary Diagnosis: Gastroenteritis        Precautions: aspiration, fall       ASSESSMENT :  Based on the objective data described below, the patient presents with mild oral and at least moderate pharyngeal dysphagia. Was tolerating a puree/honey thick liquid diet PTA per chart review and patient report which is a decline in function compared to most recent admission. He demonstrates delayed bolus formation, control and posterior propulsion, delayed swallow initiation, and reduced hyolaryngeal elevation/excursion via palpation. Double swallows suggestive of pharyngeal residue. No s/s of aspiration with diet from PTA. Would defer any changes in diet to treating SLP once patient has returned to SNF and back to baseline function as he is at increased risk for aspiration at this time and not appropriate for upgrade during acute hospitalization. Skilled therapy provided by a speech-language pathologist is not indicated at this time. PLAN :  Recommendations:  --continue puree/honey thick liquids as per diet PTA. Defer any further upgrades to SLP at SNF once he has returned to his baseline function. Discharge Recommendations: Skilled Nursing Facility     SUBJECTIVE:   Patient stated Kenny Alejo been doing this for a while now and it's been going ok.  Referring to puree/honey thick liquids     OBJECTIVE:     Past Medical History   Diagnosis Date    BCC (basal cell carcinoma of skin)     BPH (benign prostatic hypertrophy)     CAD (coronary artery disease)      3 stents and 1 baloon    Dementia     Hypertension     Other and unspecified hyperlipidemia     Overactive bladder     Pacemaker     Psychiatric disorder     Sciatica      Past Surgical History   Procedure Laterality Date    Hx pacemaker      Pr cardiac surg procedure unlist       stent placement     Prior Level of Function/Home Situation:   Home Situation  Home Environment: [address] [place] [address] Name: Sameul Greek and [employer]  One/Two Story Residence: One story  Living Alone: No  Support Systems: Skilled nursing facility  Patient Expects to be Discharged to[de-identified] Skilled nursing facility  Current DME Used/Available at Home: Yulia Paul, philipp, Walker  Diet prior to admission: puree/honey thick liquids   Current Diet:  Puree/honey thick liquids    Cognitive and Communication Status:  Neurologic State: Alert, Confused  Orientation Level: Oriented to person, Oriented to place, Disoriented to situation, Disoriented to time  Cognition: Decreased attention/concentration, Follows commands  Perception: Appears intact  Perseveration: No perseveration noted  Safety/Judgement: Not assessed  Oral Assessment:  Oral Assessment  Labial: No impairment  Oral Hygiene: moist mucosa   Lingual: No impairment  Velum: Unable to visualize  Mandible: No impairment  P.O. Trials:  Patient Position: upright in bed   Vocal quality prior to P.O.: No impairment  Consistency Presented: Puree; Honey thick liquid  How Presented: Self-fed/presented;Cup/sip;Spoon     Bolus Acceptance: No impairment  Bolus Formation/Control: Impaired  Type of Impairment: Delayed  Propulsion: Delayed (# of seconds)  Oral Residue: None  Initiation of Swallow: Delayed (# of seconds)  Laryngeal Elevation: Decreased  Aspiration Signs/Symptoms: None (on honey thick liquids at baseline )  Pharyngeal Phase Characteristics: Audible swallow;Double swallow; Suspected pharyngeal residue  Effective Modifications: Small sips and bites  Cues for Modifications: None  Comments: patient confirms baseline puree/honey thick liquid diet PTA     Oral Phase Severity: Mild  Pharyngeal Phase Severity : Moderate      G Codes:   In compliance with CMSs Claims Based Outcome Reporting, the following G-code set was chosen for this patient based the use of the NOMS functional outcome to quantify this patient's level of swallowing impairment. Using the NOMS, the patient was determined to be at level 3 for swallow function which correlates with the CL= 60-79% level of severity. Based on the objective assessment provided within this note, the current, goal, and discharge g-codes are as follows:    Swallow  Swallowing:   Swallow Current Status CL= 60-79%   Swallow Goal Status CL= 60-79%   Swallow D/C Status CL= 60-79%      NOMS Swallowing Levels:  Level 1 (CN): NPO  Level 2 (CM): NPO but takes consistency in therapy  Level 3 (CL): Takes less than 50% of nutrition p.o. and continues with nonoral feedings; and/or safe with mod cues; and/or max diet restriction  Level 4 (CK): Safe swallow but needs mod cues; and/or mod diet restriction; and/or still requires some nonoral feeding/supplements  Level 5 (CJ): Safe swallow with min diet restriction; and/or needs min cues  Level 6 (CI): Independent with p.o.; rare cues; usually self cues; may need to avoid some foods or needs extra time  Level 7 ([address] [place] [address]): Independent for all p.o.  EMILY. (2003). National Outcomes Measurement System (NOMS): Adult Speech-Language Pathology User's Guide. Pain:  Pain Scale 1: Numeric (0 - 10)  Pain Intensity 1: 0     After treatment:   [] Patient left in no apparent distress sitting up in chair  [x] Patient left in no apparent distress in bed  [x] Call bell left within reach  [x] Nursing notified  [] Caregiver present  [] Bed alarm activated    COMMUNICATION/EDUCATION:   The patients plan of care including findings, recommendations, and recommended diet changes were discussed with: Registered Nurse. [x] Patient/family have participated as able and agree with findings and recommendations. [] Patient is unable to participate in plan of care at this time. Thank you for this referral.  Josh Burgos M.CD.  CCC-SLP   Time Calculation: 14 mins

## 2017-01-30 NOTE — DISCHARGE INSTRUCTIONS
Discharge Summary         PATIENT ID: Carolina Boogie  MRN: [id number]   YOB: 1918    DATE OF ADMISSION: 1/28/2017 9:29 PM    DATE OF DISCHARGE: 1/30/2017   PRIMARY CARE PROVIDER: Ashley Ferreira MD      ATTENDING PHYSICIAN: Chito Le MD  DISCHARGING PROVIDER: Jessica Ley NP   To contact this individual call [phone] and ask the  to page. If unavailable ask to be transferred the Adult Hospitalist Department.     CONSULTATIONS: IP CONSULT TO CARDIOLOGY     PROCEDURES/SURGERIES: * No surgery found *     ADMITTING [address]:   Per H&P  Mr. Gregoria Jay is a 80 y.o.  male who is admitted with acute gastroenteritis. Mr. Gregoria Jay was sent from the NH to the Emergency Department complaining of nausea and vomiting and diarrhea . History from the chart and ER physician, currently sleeping and not willing to provide history. He had 3 vomiting in the last 6 hours prior to arrival and had x2 diarrhea . He also complains of body aches and having left sided CP since yesterday with associated SOB .      1/30/2017  VSS, offers no complaints, denies abd pain, N/V/D. Did not have any diarrhea during hospital stay to send off studies. Pt was adequately hydrated, eating/drinking w/o problems. Spoke with pt's son, Peri Bunn, aware of d/c back to NH today. No need for abx.      DISCHARGE DIAGNOSES / PLAN:       Acute Gastroenteritis - Resolved  - CT Abdomen and Pelvis: Nonobstructing right renal stone.  No acute abnormality  - CXR : No acute finding   -IVF   -PRN IV Antiemetic      Lt side Chest pain   - H/O CAD /Stent, he is paced on telemetry  - Continue Plavix and Statin   - Serial Troponin negative x2  - evaluated by Cardiology => Would not pursue any further cardiac work-up and favor fairly conservative measures in general with this elderly male.      Dehydration Marramon Perez : Resolved  - IV Hydration       Dementia  - Supportive care         PENDING TEST RESULTS:   At the time of discharge the following test results are still pending: none     FOLLOW UP APPOINTMENTS:   Follow-up Information     Follow up With Details Comments Contact Info     Alyssa Ruffin MD   post hospitalization in 1 week [address] [facility] [id number]  [phone]            ADDITIONAL CARE RECOMMENDATIONS: as above     DIET: Puree diet with honey thick liquids, supplement with fortified pudding and magic cups with meals     ACTIVITY: activity as tolerated     WOUND CARE: none     EQUIPMENT needed: none        DISCHARGE MEDICATIONS:        Current Discharge Medication List             CONTINUE these medications which have NOT CHANGED     Details   simvastatin (ZOCOR) 20 mg tablet Take 20 mg by mouth nightly.       triamcinolone (ARISTOCORT) 0.5 % topical cream Apply to affected area two (2) times daily as needed for Skin Irritation. use thin layer       levothyroxine (SYNTHROID) 88 mcg tablet TAKE 1 TABLET BY MOUTH EVERY MORNING BEFORE BREAKFAST OR OTHER MEDICATIONS DX:THYROID  Qty: 90 Tab, Refills: 1       ferrous sulfate 325 mg (65 mg iron) tablet TAKE 1 TAB BY MOUTH TWO (2) TIMES A DAY. Qty: 180 Tab, Refills: 1       CEROVITE SENIOR tab tablet TAKE 1 TABLET BY MOUTH EVERY MORNING  Qty: 90 tablet, Refills: 4       clopidogrel (PLAVIX) 75 mg tablet Take 1 Tab by mouth daily for 360 days.   Qty: 30 Tab, Refills: 11       nystatin (MYCOSTATIN) 100,000 unit/gram ointment Apply to affected area two (2) times a day.       acetaminophen (TYLENOL EXTRA STRENGTH) 500 mg tablet Take by mouth every six (6) hours as needed for Pain.       ascorbic acid, vitamin C, (VITAMIN C) 500 mg tablet Take 500 mg by mouth.       mirtazapine (REMERON) 15 mg tablet Take 15 mg by mouth nightly.       promethazine (PHENERGAN) 25 mg tablet Take 25 mg by mouth every six (6) hours as needed for Nausea.       guaiFENesin-codeine (ROBITUSSIN AC) 100-10 mg/5 mL solution Take 10 mL by mouth three (3) times daily as needed for Cough.       magnesium hydroxide (Follica MILK OF MAGNESIA) 400 mg/5 mL suspension Take 20 mL by mouth daily as needed for Constipation.       loratadine 10 mg cap Take 10 mg by mouth daily.       pantoprazole (PROTONIX) 20 mg tablet Take 20 mg by mouth daily.       traMADol (ULTRAM) 50 mg tablet Take 50 mg by mouth two (2) times daily as needed for Pain.       tamsulosin (FLOMAX) 0.4 mg capsule Take 0.4 mg by mouth daily.       sertraline (ZOLOFT) 50 mg tablet Take 50 mg by mouth daily.       donepezil (ARICEPT) 10 mg tablet TAKE 1 TABLET BY MOUTH DAILY AT BEDTIME  Qty: 90 Tab, Refills: 1                STOP taking these medications         diphenoxylate-atropine (LOMOTIL) 2.5-0.025 mg per tablet Comments:   Reason for Stopping:                       NOTIFY YOUR PHYSICIAN FOR ANY OF THE FOLLOWING:   Fever over 101 degrees for 24 hours. Chest pain, shortness of breath, fever, chills, nausea, vomiting, diarrhea, change in mentation, falling, weakness, bleeding. Severe pain or pain not relieved by medications. Or, any other signs or symptoms that you may have questions about.     DISPOSITION:     Home With:    OT   PT   HH   RN      xx Long term SNF/Inpatient Rehab     Independent/assisted living     Hospice     Other:         PATIENT CONDITION AT DISCHARGE:      Functional status     Poor    xx Deconditioned      Independent       Cognition      Lucid      Forgetful    xx Dementia       Catheters/lines (plus indication)     George      PICC      PEG    xx None       Code status   xx Full code      DNR       PHYSICAL EXAMINATION AT DISCHARGE:  Constitutional: No acute distress, pleasant    ENT: Oral mucous moist, oropharynx benign. Neck supple   Resp: CTA bilaterally. No wheezing/rhonchi/rales. No accessory muscle use   CV: Regular rhythm, normal rate, no murmurs, gallops, rubs   GI: Soft, non distended, non tender.  normoactive bowel sounds, no hepatosplenomegaly    Musculoskeletal: No edema, warm, 2+ pulses throughout   Neurologic: Moves all extremities. AA&Ox2  Psych: no anxietyagitation                                                CHRONIC MEDICAL DIAGNOSES:  Problem List as of 1/30/2017  Date Reviewed: 1/30/2017             Codes Class Noted - Resolved     UTI (lower urinary tract infection) ICD-10-CM: N39.0  ICD-9-CM: 599.0   4/11/2015 - Present           Encephalopathy ICD-10-CM: G93.40  ICD-9-CM: 348.30   4/11/2015 - Present           Altered mental status ICD-10-CM: R41.82  ICD-9-CM: 780.97   11/19/2014 - Present           Mild cognitive impairment ICD-10-CM: G31.84  ICD-9-CM: 331.83   12/12/2013 - Present           Chronic diarrhea ICD-10-CM: K52.9  ICD-9-CM: 787.91   6/18/2012 - Present           Hypertension ICD-10-CM: I10  ICD-9-CM: 619. 9   2/1/2012 - Present           Hypothyroidism ICD-10-CM: E03.9  ICD-9-CM: 244. 9   8/23/2011 - Present           GERD (gastroesophageal reflux disease) ICD-10-CM: K21.9  ICD-9-CM: 530.81   7/10/2011 - Present           Other and unspecified hyperlipidemia ICD-10-CM: E78.5  ICD-9-CM: 272.4   3/29/2011 - Present           BPH (benign prostatic hypertrophy) ICD-10-CM: N40.0  ICD-9-CM: 600.00   3/29/2011 - Present           Depression ICD-10-CM: F32.9  ICD-9-CM: 311   3/29/2011 - Present           Sciatica ICD-10-CM: M54.30  ICD-9-CM: 171. 3   3/29/2011 - Present           Sleep apnea ICD-10-CM: G47.30  ICD-9-CM: 780.57   3/29/2011 - Present           Eczema ICD-10-CM: L30.9  ICD-9-CM: 692.9   3/29/2011 - Present           BCC (basal cell carcinoma of skin) ICD-10-CM: C44.91  ICD-9-CM: 173.91   3/29/2011 - Present           Vitamin D deficiency ICD-10-CM: E55.9  ICD-9-CM: 268. 9   3/29/2011 - Present           Osteoporosis ICD-10-CM: M81.0  ICD-9-CM: 733.00   3/29/2011 - Present           Restless leg syndrome ICD-10-CM: G25.81  ICD-9-CM: 333.94   3/29/2011 - Present           DVT (deep venous thrombosis) (HCC) ICD-10-CM: I82.409  ICD-9-CM: 453.40   3/29/2011 - Present           CAD (coronary artery disease) ICD-10-CM: I25.10  ICD-9-CM: 414.00   12/30/2010 - Present           Dementia ICD-10-CM: F03.90  ICD-9-CM: 294.20   12/30/2010 - Present           * (Principal)RESOLVED: Gastroenteritis ICD-10-CM: K52.9  ICD-9-CM: 558. 9   1/29/2017 - 1/30/2017           RESOLVED: Acute encephalopathy ICD-10-CM: G93.40  ICD-9-CM: 348.30   12/9/2016 - 12/13/2016           RESOLVED: Hypotension ICD-10-CM: I95.9  ICD-9-CM: 458. 9   5/11/2013 - 3/27/2014           RESOLVED: Dementia ICD-10-CM: F03.90  ICD-9-CM: 294.20   3/29/2011 - 7/28/2012                  Greater than 35 minutes were spent with the patient on counseling and coordination of care     Signed:   Yuliana Ornelas NP  1/30/2017  1:42 PM

## 2017-01-30 NOTE — DISCHARGE SUMMARY
Discharge Summary       PATIENT ID: Holger Garcia  MRN: [id number]   YOB: 1918    DATE OF ADMISSION: 1/28/2017  9:29 PM    DATE OF DISCHARGE: 1/30/2017   PRIMARY CARE PROVIDER: Patricia Donnelly MD     ATTENDING PHYSICIAN: Jose Baum MD  DISCHARGING PROVIDER: Gris David NP    To contact this individual call [phone] and ask the  to page. If unavailable ask to be transferred the Adult Hospitalist Department. CONSULTATIONS: IP CONSULT TO CARDIOLOGY    PROCEDURES/SURGERIES: * No surgery found *    ADMITTING DIAGNOSES & HOSPITAL COURSE:   Per H&P  Mr. Millie Porter is a 80 y.o.  male who is admitted with acute gastroenteritis. Mr. Millie Porter was sent from the NH to the Emergency Department complaining of nausea and vomiting and diarrhea . History from the chart and ER physician, currently sleeping and not willing to provide history. He had 3 vomiting in the last 6 hours prior to arrival and had x2 diarrhea . He also complains of body aches and having left sided CP since yesterday with associated SOB .     1/30/2017  VSS, offers no complaints, denies abd pain, N/V/D. Did not have any diarrhea during hospital stay to send off studies. Pt was adequately hydrated, eating/drinking w/o problems. Spoke with pt's son, Frida Carlisle, aware of d/c back to NH today. No need for abx. DISCHARGE DIAGNOSES / PLAN:      Acute Gastroenteritis - Resolved  - CT Abdomen and Pelvis: Nonobstructing right renal stone.  No acute abnormality  - CXR : No acute finding   -IVF   -PRN IV Antiemetic     Lt side Chest pain   - H/O CAD /Stent, he is paced on telemetry  - Continue Plavix and Statin   - Serial Troponin negative x2  - evaluated by Cardiology => Would not pursue any further cardiac work-up and favor fairly conservative measures in general with this elderly male.      Dehydration Arrie Learn : Resolved  - IV Hydration       Dementia  - Supportive care       PENDING TEST RESULTS:   At the time of discharge the following test results are still pending: none    FOLLOW UP APPOINTMENTS:    Follow-up Information     Follow up With Details Comments Contact Info    Alvarado Whelan MD  post hospitalization in 1 week [address] [address] [phone]             ADDITIONAL CARE RECOMMENDATIONS: as above    DIET: Puree diet with honey thick liquids, supplement with fortified pudding and magic cups with meals    ACTIVITY: activity as tolerated    WOUND CARE: none    EQUIPMENT needed: none      DISCHARGE MEDICATIONS:  Current Discharge Medication List      CONTINUE these medications which have NOT CHANGED    Details   simvastatin (ZOCOR) 20 mg tablet Take 20 mg by mouth nightly. triamcinolone (ARISTOCORT) 0.5 % topical cream Apply  to affected area two (2) times daily as needed for Skin Irritation. use thin layer      levothyroxine (SYNTHROID) 88 mcg tablet TAKE 1 TABLET BY MOUTH EVERY MORNING BEFORE BREAKFAST OR OTHER MEDICATIONS DX:THYROID  Qty: 90 Tab, Refills: 1      ferrous sulfate 325 mg (65 mg iron) tablet TAKE 1 TAB BY MOUTH TWO (2) TIMES A DAY. Qty: 180 Tab, Refills: 1      CEROVITE SENIOR tab tablet TAKE 1 TABLET BY MOUTH EVERY MORNING  Qty: 90 tablet, Refills: 4      clopidogrel (PLAVIX) 75 mg tablet Take 1 Tab by mouth daily for 360 days. Qty: 30 Tab, Refills: 11      nystatin (MYCOSTATIN) 100,000 unit/gram ointment Apply  to affected area two (2) times a day. acetaminophen (TYLENOL EXTRA STRENGTH) 500 mg tablet Take  by mouth every six (6) hours as needed for Pain. ascorbic acid, vitamin C, (VITAMIN C) 500 mg tablet Take 500 mg by mouth.      mirtazapine (REMERON) 15 mg tablet Take 15 mg by mouth nightly. promethazine (PHENERGAN) 25 mg tablet Take 25 mg by mouth every six (6) hours as needed for Nausea. guaiFENesin-codeine (ROBITUSSIN AC) 100-10 mg/5 mL solution Take 10 mL by mouth three (3) times daily as needed for Cough. magnesium hydroxide (ZAZUETA MILK OF MAGNESIA) 400 mg/5 mL suspension Take 20 mL by mouth daily as needed for Constipation. loratadine 10 mg cap Take 10 mg by mouth daily. pantoprazole (PROTONIX) 20 mg tablet Take 20 mg by mouth daily. traMADol (ULTRAM) 50 mg tablet Take 50 mg by mouth two (2) times daily as needed for Pain.      tamsulosin (FLOMAX) 0.4 mg capsule Take 0.4 mg by mouth daily. sertraline (ZOLOFT) 50 mg tablet Take 50 mg by mouth daily. donepezil (ARICEPT) 10 mg tablet TAKE 1 TABLET BY MOUTH DAILY AT BEDTIME  Qty: 90 Tab, Refills: 1         STOP taking these medications       diphenoxylate-atropine (LOMOTIL) 2.5-0.025 mg per tablet Comments:   Reason for Stopping:                 NOTIFY YOUR PHYSICIAN FOR ANY OF THE FOLLOWING:   Fever over 101 degrees for 24 hours. Chest pain, shortness of breath, fever, chills, nausea, vomiting, diarrhea, change in mentation, falling, weakness, bleeding. Severe pain or pain not relieved by medications. Or, any other signs or symptoms that you may have questions about. DISPOSITION:    Home With:   OT  PT  HH  RN      xx Long term SNF/Inpatient Rehab    Independent/assisted living    Hospice    Other:       PATIENT CONDITION AT DISCHARGE:     Functional status    Poor    xx Deconditioned     Independent      Cognition     Lucid     Forgetful    xx Dementia      Catheters/lines (plus indication)    George     PICC     PEG    xx None      Code status    xx Full code     DNR      PHYSICAL EXAMINATION AT DISCHARGE:  Constitutional: No acute distress, pleasant    ENT: Oral mucous moist, oropharynx benign. Neck supple   Resp: CTA bilaterally. No wheezing/rhonchi/rales. No accessory muscle use   CV: Regular rhythm, normal rate, no murmurs, gallops, rubs   GI: Soft, non distended, non tender. normoactive bowel sounds, no hepatosplenomegaly    Musculoskeletal: No edema, warm, 2+ pulses throughout   Neurologic: Moves all extremities. AA&Ox2  Psych: no anxietyagitation                                 CHRONIC MEDICAL DIAGNOSES:  Problem List as of 1/30/2017  Date Reviewed: 1/30/2017          Codes Class Noted - Resolved    UTI (lower urinary tract infection) ICD-10-CM: N39.0  ICD-9-CM: 599.0  4/11/2015 - Present        Encephalopathy ICD-10-CM: G93.40  ICD-9-CM: 348.30  4/11/2015 - Present        Altered mental status ICD-10-CM: R41.82  ICD-9-CM: 780.97  11/19/2014 - Present        Mild cognitive impairment ICD-10-CM: G31.84  ICD-9-CM: 331.83  12/12/2013 - Present        Chronic diarrhea ICD-10-CM: K52.9  ICD-9-CM: 787.91  6/18/2012 - Present        Hypertension ICD-10-CM: I10  ICD-9-CM: 401.9  2/1/2012 - Present        Hypothyroidism ICD-10-CM: E03.9  ICD-9-CM: 244.9  8/23/2011 - Present        GERD (gastroesophageal reflux disease) ICD-10-CM: K21.9  ICD-9-CM: 530.81  7/10/2011 - Present        Other and unspecified hyperlipidemia ICD-10-CM: E78.5  ICD-9-CM: 272.4  3/29/2011 - Present        BPH (benign prostatic hypertrophy) ICD-10-CM: N40.0  ICD-9-CM: 600.00  3/29/2011 - Present        Depression ICD-10-CM: F32.9  ICD-9-CM: 733  3/29/2011 - Present        Sciatica ICD-10-CM: M54.30  ICD-9-CM: 724.3  3/29/2011 - Present        Sleep apnea ICD-10-CM: G47.30  ICD-9-CM: 780.57  3/29/2011 - Present        Eczema ICD-10-CM: L30.9  ICD-9-CM: 692.9  3/29/2011 - Present        BCC (basal cell carcinoma of skin) ICD-10-CM: C44.91  ICD-9-CM: 173.91  3/29/2011 - Present        Vitamin D deficiency ICD-10-CM: E55.9  ICD-9-CM: 268.9  3/29/2011 - Present        Osteoporosis ICD-10-CM: M81.0  ICD-9-CM: 733.00  3/29/2011 - Present        Restless leg syndrome ICD-10-CM: G25.81  ICD-9-CM: 333.94  3/29/2011 - Present        DVT (deep venous thrombosis) (HCC) ICD-10-CM: I82.409  ICD-9-CM: 453.40  3/29/2011 - Present        CAD (coronary artery disease) ICD-10-CM: I25.10  ICD-9-CM: 414.00  12/30/2010 - Present        Dementia ICD-10-CM: F03.90  ICD-9-CM: 294.20 12/30/2010 - Present        * (Principal)RESOLVED: Gastroenteritis ICD-10-CM: K52.9  ICD-9-CM: 558.9  1/29/2017 - 1/30/2017        RESOLVED: Acute encephalopathy ICD-10-CM: G93.40  ICD-9-CM: 348.30  12/9/2016 - 12/13/2016        RESOLVED: Hypotension ICD-10-CM: I95.9  ICD-9-CM: 458.9  5/11/2013 - 3/27/2014        RESOLVED: Dementia ICD-10-CM: F03.90  ICD-9-CM: 294.20  3/29/2011 - 7/28/2012              Greater than 35 minutes were spent with the patient on counseling and coordination of care    Signed:   Preethi Ghosh NP  1/30/2017  1:42 PM

## 2017-01-30 NOTE — ROUTINE PROCESS
Bedside shift change report given to Boogie Villarreal (oncoming nurse) by Danita  (offgoing nurse). Report included the following information SBAR, Kardex, Recent Results and Cardiac Rhythm PAced.

## 2017-01-30 NOTE — PROGRESS NOTES
Spiritual Care Assessment/Progress Notes    Minna Zelaya [id number]  [id number]    8/8/1918  80 y.o.  male    Patient Telephone Number: [phone] (home)   Christian Affiliation: Orthodox   Language: English   Extended Emergency Contact Information  Primary Emergency Contact: Oxana [facility]  Address: [address] [address] [address] [facility] Phone: [phone]  Work Phone: [phone]  Mobile Phone: [phone]  Relation: Child  Secondary Emergency Contact: Alex Salazar  Address: [address] Phone: [phone]  Relation: Child   Patient Active Problem List    Diagnosis Date Noted    Gastroenteritis 01/29/2017    UTI (lower urinary tract infection) 04/11/2015    Encephalopathy 04/11/2015    Altered mental status 11/19/2014    Mild cognitive impairment 12/12/2013    Chronic diarrhea 06/18/2012    Hypertension 02/01/2012    Hypothyroidism 08/23/2011    GERD (gastroesophageal reflux disease) 07/10/2011    Other and unspecified hyperlipidemia 03/29/2011    BPH (benign prostatic hypertrophy) 03/29/2011    Depression 03/29/2011    Sciatica 03/29/2011    Sleep apnea 03/29/2011    Eczema 03/29/2011    BCC (basal cell carcinoma of skin) 03/29/2011    Vitamin D deficiency 03/29/2011    Osteoporosis 03/29/2011    Restless leg syndrome 03/29/2011    DVT [facility]deep [facility] ([facility] 03/29/2011    CAD (coronary artery disease) 12/30/2010    Dementia 12/30/2010        Date: 1/30/2017       Level of Christian/Spiritual Activity:  []         Involved in adrianna tradition/spiritual practice    []         Not involved in adrianna tradition/spiritual practice  []         Spiritually oriented    []         Claims no spiritual orientation    []         seeking spiritual identity  []         Feels alienated from Jainism practice/tradition  []         Feels angry about Jainism practice/tradition  [] Spirituality/Quaker trasource for coping at this time. []         Not able to assess due to medical condition    Services Provided Today:  []         crisis intervention    []         reading Scriptures  []         spiritual assessment    []         Prayer for healing / Mishabeirach  [x]         empathic listening/emotional support  []         rites and rituals (cite in comments)  [x]         life review     []         Quaker support  []         theological development   []         advocacy  []         ethical dialog     [x]         Psalms for healing / Tehillim  []         bereavement support    []         support to family  []         anticipatory grief support   []         help with AMD  []         spiritual guidance    []         meditation      Spiritual Care Needs  []         Emotional Support  []         Spiritual/Scientologist Care  []         Loss/Adjustment  []         Advocacy/Referral /Ethics  [x]         No needs expressed at this time  []         Other: (note in comments)  [address]  []         Follow up visits with pt/family  []         Provide materials  []         Schedule sacraments  []         Contact Community Clergy  [x]         Follow up as needed  []         Other: (note in comments)     Comments: Patient was appreciative of visit. He had been a longtime member of multiple congregations. He has supprortive family locally. He was clear on many conversational points but leess clear on others. He spoke of family mostly. Upper sorbian prayer and words of comfort offered. Advised of continuous availability. Nothing further at this time.          Gosia Garza, [facility], Scientologist Provider

## 2017-01-30 NOTE — PROGRESS NOTES
NUTRITION COMPLETE ASSESSMENT    RECOMMENDATIONS:   1. Continue current diet, Supplements (specify) (Boost pudding q day, Magic cup q day)  2. Set up tray for pt, encourage increased intake; document po intake in flow sheets  3. Weekly weights     Interventions/Plan:   Food/Nutrient Delivery:           RD to follow po intake, weight status    Assessment:   Reason for Assessment:   [x]BPA/MST Referral     Diet: Puree (honey thick liquids)  Supplements: Boost pudding a day, Magic cup q day  Nutritionally Significant Medications: [x] Reviewed & Includes: Synthroid, NS 100mL/hr  Meal Intake: No data found. Current Hospitalization:   Fluid Restriction:  None  Appetite: Poor  PO Ability: With assist Average po intake:25-50%  Average supplements intake:  unsure      Subjective:  Pt confused; unable to interview; no family present    Objective:  Pt seen for MST. Pt admitted with gastroenteritis. PMHx: BPH, basal cell carcinoma of skin, CAD, dementia, HTN, others noted. Reviewed chart, spoke with RN. Pt seen by SLP, swallowing issues noted- deferred to SLP recommendations at [facility] for diet- puree with honey thickened liquids. RN states pt ate ~25-30% meal; when prompted to eat more pt stated he was full and didn't want more. Noted ~10 lb wt loss since Dec 2016 per EHR. RD added supplements this AM; Boost pudding q day provides 240 kcal, 7g protein; Magic cup q day provides 290 kcal, 9 g protein. Both supplements provide 530 kcal, 16g protein meeting 33% caloric, 25% protein needs. RD to follow po intake meals, supplements, weight status. Estimated Nutrition Needs:   Kcals/day: 1732 Kcals/day (6981-3086 kcal/day (MSJ x 1.2-1.3 + 250 for wt gain))  Protein: 63 g (63-69g/day (1-1.1g/kg))  Fluid: 1732 ml (1mL/kcal)  Based On: [address]  Weight Used: Actual wt    Pt expected to meet estimated nutrient needs:  []   Yes     []  No [x] Unable to predict at this time    Nutrition Diagnosis:   1. Inadequate protein-energy intake related to fair to poor po intake  as evidenced by pt consuming 25-30% meals with prompting    Goals:      Pt will consume 50% meals, supplements over next 5-7 days     Monitoring & Evaluation:    - Total energy intake   - Weight/weight change   -      Previous Nutrition Goals Met:   N/A  Previous Recommendations:    N/A    Education & Discharge Needs:   [x] None Identified   [] Identified and addressed    [] Participated in care plan, discharge planning, and/or interdisciplinary rounds        Cultural, Scientology and ethnic food preferences identified: None    Skin Integrity: [x]Intact  []Other  Edema: [x]None []Other  Last BM: 1/28  Food Allergies: [x]None []Other  Diet Restrictions: Cultural/Yazidi Preference(s): None     Anthropometrics:    Weight Loss Metrics 1/30/2017 12/12/2016 4/25/2016 6/29/2015 6/25/2015 6/12/2015 5/18/2015   Today's Wt 137 lb 12.8 oz 150 lb 2.1 oz 146 lb 9.6 oz 181 lb 182 lb 182 lb 183 lb 9.6 oz   BMI 20.35 kg/m2 22.17 kg/m2 22.96 kg/m2 25.97 kg/m2 26.11 kg/m2 26.11 kg/m2 26.34 kg/m2      Weight Source: Bed  Height: 5' 9\" (175.3 cm),    Body mass index is 20.35 kg/(m^2).    ,     ,      Labs:    Lab Results   Component Value Date/Time    Sodium 146 01/30/2017 04:01 AM    Potassium 3.6 01/30/2017 04:01 AM    Chloride 114 01/30/2017 04:01 AM    CO2 25 01/30/2017 04:01 AM    Glucose 85 01/30/2017 04:01 AM    BUN 34 01/30/2017 04:01 AM    Creatinine 1.11 01/30/2017 04:01 AM    Calcium 8.3 01/30/2017 04:01 AM    Magnesium 1.6 01/30/2017 04:01 AM    Phosphorus 3.2 01/30/2017 04:01 AM    Albumin 2.4 01/30/2017 04:01 AM     Lab Results   Component Value Date/Time    Hemoglobin A1c 6.0 04/04/2012 08:58 AM         Juanjose Harris RD

## 2017-01-30 NOTE — PROGRESS NOTES
NUTRITION brief     Chart reviewed. Pt on honey thick liquids at facility prior to admission. Diet order adjusted with supplements added (gets house shake at facility as well). Plan to see for full assessment later today.      Hang Cazares RD

## 2017-01-30 NOTE — INTERDISCIPLINARY ROUNDS
IDR/SLIDR Summary          Patient: Kiko Marquez MRN: [id number]    Age: 80 y.o. YOB: 1918 Room/Bed: [facility]   Admit Diagnosis: Gastroenteritis  Principal Diagnosis: Gastroenteritis   Goals: Pain Control  Readmission: NO  Quality Measure: Not applicable  VTE Prophylaxis: Chemical  Influenza Vaccine screening completed? YES  Pneumococcal Vaccine screening completed? NO  Mobility needs: No   Nutrition plan:Yes  Consults:Case Management    Financial concerns:No  Escalated to CM? NO  RRAT Score:    Interventions:  Testing due for pt today?  NO  LOS: 0 days Expected length of stay 1 days  Discharge plan: [facility]   PCP: Silvana Rivera MD  Transportation needs: Yes    Days before discharge:one day until discharge   Discharge disposition: [facility]    Signed:     Дмитрий Mina RN  1/30/2017  1:33 AM

## 2017-01-30 NOTE — PROGRESS NOTES
Cm informed that patient is stable for discharge, will be returning to [facility] and [address] [place] [address]. Referral sent to them via ecin with updates, and discharge instructions have been faxed. [facility] transport will  patient at 4:30 pm. Report can be called to [phone].   Laurence HAYWARD, ACM    [facility] responded that they can come at 5:30 pm.  VALERIO Clemons

## 2017-03-07 ENCOUNTER — APPOINTMENT (OUTPATIENT)
Dept: CT IMAGING | Age: 82
End: 2017-03-07
Attending: EMERGENCY MEDICINE
Payer: MEDICARE

## 2017-03-07 ENCOUNTER — HOSPITAL ENCOUNTER (EMERGENCY)
Age: 82
Discharge: HOME | End: 2017-03-07
Attending: EMERGENCY MEDICINE
Payer: MEDICARE

## 2017-03-07 VITALS
HEIGHT: 71 IN | HEART RATE: 72 BPM | WEIGHT: 137 LBS | SYSTOLIC BLOOD PRESSURE: 136 MMHG | BODY MASS INDEX: 19.18 KG/M2 | OXYGEN SATURATION: 99 % | DIASTOLIC BLOOD PRESSURE: 61 MMHG | RESPIRATION RATE: 11 BRPM | TEMPERATURE: 98 F

## 2017-03-07 DIAGNOSIS — N20.1 URETER, CALCULUS: Primary | ICD-10-CM

## 2017-03-07 LAB
ALBUMIN SERPL BCP-MCNC: 3.2 G/DL (ref 3.5–5)
ALBUMIN/GLOB SERPL: 0.8 {RATIO} (ref 1.1–2.2)
ALP SERPL-CCNC: 67 U/L (ref 45–117)
ALT SERPL-CCNC: 16 U/L (ref 12–78)
ANION GAP BLD CALC-SCNC: 6 MMOL/L (ref 5–15)
APPEARANCE UR: CLEAR
AST SERPL W P-5'-P-CCNC: 16 U/L (ref 15–37)
BACTERIA URNS QL MICRO: NEGATIVE /HPF
BASOPHILS # BLD AUTO: 0 K/UL (ref 0–0.1)
BASOPHILS # BLD: 0 % (ref 0–1)
BILIRUB SERPL-MCNC: 0.3 MG/DL (ref 0.2–1)
BILIRUB UR QL: NEGATIVE
BUN SERPL-MCNC: 23 MG/DL (ref 6–20)
BUN/CREAT SERPL: 19 (ref 12–20)
CALCIUM SERPL-MCNC: 8.8 MG/DL (ref 8.5–10.1)
CHLORIDE SERPL-SCNC: 102 MMOL/L (ref 97–108)
CO2 SERPL-SCNC: 29 MMOL/L (ref 21–32)
COLOR UR: ABNORMAL
CREAT SERPL-MCNC: 1.22 MG/DL (ref 0.7–1.3)
DIFFERENTIAL METHOD BLD: ABNORMAL
EOSINOPHIL # BLD: 1.2 K/UL (ref 0–0.4)
EOSINOPHIL NFR BLD: 14 % (ref 0–7)
EPITH CASTS URNS QL MICRO: ABNORMAL /LPF
ERYTHROCYTE [DISTWIDTH] IN BLOOD BY AUTOMATED COUNT: 15.1 % (ref 11.5–14.5)
GLOBULIN SER CALC-MCNC: 3.8 G/DL (ref 2–4)
GLUCOSE SERPL-MCNC: 107 MG/DL (ref 65–100)
GLUCOSE UR STRIP.AUTO-MCNC: NEGATIVE MG/DL
HCT VFR BLD AUTO: 31.2 % (ref 36.6–50.3)
HGB BLD-MCNC: 9.9 G/DL (ref 12.1–17)
HGB UR QL STRIP: ABNORMAL
INR PPP: 1.1 (ref 0.9–1.1)
KETONES UR QL STRIP.AUTO: NEGATIVE MG/DL
LEUKOCYTE ESTERASE UR QL STRIP.AUTO: ABNORMAL
LIPASE SERPL-CCNC: 89 U/L (ref 73–393)
LYMPHOCYTES # BLD AUTO: 12 % (ref 12–49)
LYMPHOCYTES # BLD: 1.1 K/UL (ref 0.8–3.5)
MCH RBC QN AUTO: 31 PG (ref 26–34)
MCHC RBC AUTO-ENTMCNC: 31.7 G/DL (ref 30–36.5)
MCV RBC AUTO: 97.8 FL (ref 80–99)
MONOCYTES # BLD: 0.5 K/UL (ref 0–1)
MONOCYTES NFR BLD AUTO: 6 % (ref 5–13)
NEUTS SEG # BLD: 6.1 K/UL (ref 1.8–8)
NEUTS SEG NFR BLD AUTO: 68 % (ref 32–75)
NITRITE UR QL STRIP.AUTO: NEGATIVE
PH UR STRIP: 7.5 [PH] (ref 5–8)
PLATELET # BLD AUTO: 190 K/UL (ref 150–400)
POTASSIUM SERPL-SCNC: 4 MMOL/L (ref 3.5–5.1)
PROT SERPL-MCNC: 7 G/DL (ref 6.4–8.2)
PROT UR STRIP-MCNC: ABNORMAL MG/DL
PROTHROMBIN TIME: 11.2 SEC (ref 9–11.1)
RBC # BLD AUTO: 3.19 M/UL (ref 4.1–5.7)
RBC #/AREA URNS HPF: ABNORMAL /HPF (ref 0–5)
RBC MORPH BLD: ABNORMAL
SODIUM SERPL-SCNC: 137 MMOL/L (ref 136–145)
SP GR UR REFRACTOMETRY: 1.01 (ref 1–1.03)
TROPONIN I SERPL-MCNC: <0.04 NG/ML
UROBILINOGEN UR QL STRIP.AUTO: 0.2 EU/DL (ref 0.2–1)
WBC # BLD AUTO: 8.9 K/UL (ref 4.1–11.1)
WBC URNS QL MICRO: ABNORMAL /HPF (ref 0–4)

## 2017-03-07 PROCEDURE — 85025 COMPLETE CBC W/AUTO DIFF WBC: CPT | Performed by: EMERGENCY MEDICINE

## 2017-03-07 PROCEDURE — 99285 EMERGENCY DEPT VISIT HI MDM: CPT

## 2017-03-07 PROCEDURE — 74011250637 HC RX REV CODE- 250/637: Performed by: EMERGENCY MEDICINE

## 2017-03-07 PROCEDURE — 84484 ASSAY OF TROPONIN QUANT: CPT | Performed by: EMERGENCY MEDICINE

## 2017-03-07 PROCEDURE — 36415 COLL VENOUS BLD VENIPUNCTURE: CPT | Performed by: EMERGENCY MEDICINE

## 2017-03-07 PROCEDURE — 81001 URINALYSIS AUTO W/SCOPE: CPT | Performed by: EMERGENCY MEDICINE

## 2017-03-07 PROCEDURE — 80053 COMPREHEN METABOLIC PANEL: CPT | Performed by: EMERGENCY MEDICINE

## 2017-03-07 PROCEDURE — 74176 CT ABD & PELVIS W/O CONTRAST: CPT

## 2017-03-07 PROCEDURE — 93005 ELECTROCARDIOGRAM TRACING: CPT

## 2017-03-07 PROCEDURE — 85610 PROTHROMBIN TIME: CPT | Performed by: EMERGENCY MEDICINE

## 2017-03-07 PROCEDURE — 83690 ASSAY OF LIPASE: CPT | Performed by: EMERGENCY MEDICINE

## 2017-03-07 PROCEDURE — 94762 N-INVAS EAR/PLS OXIMTRY CONT: CPT

## 2017-03-07 RX ORDER — LORAZEPAM 0.5 MG/1
0.5 TABLET ORAL
COMMUNITY

## 2017-03-07 RX ORDER — ONDANSETRON 4 MG/1
4 TABLET, ORALLY DISINTEGRATING ORAL
Qty: 10 TAB | Refills: 0 | Status: SHIPPED | OUTPATIENT
Start: 2017-03-07

## 2017-03-07 RX ORDER — TAMSULOSIN HYDROCHLORIDE 0.4 MG/1
0.4 CAPSULE ORAL DAILY
Qty: 15 CAP | Refills: 0 | Status: SHIPPED | OUTPATIENT
Start: 2017-03-07 | End: 2017-03-22

## 2017-03-07 RX ORDER — TRAMADOL HYDROCHLORIDE 50 MG/1
50 TABLET ORAL
Status: COMPLETED | OUTPATIENT
Start: 2017-03-07 | End: 2017-03-07

## 2017-03-07 RX ORDER — PROMETHAZINE HYDROCHLORIDE 25 MG/ML
12.5 INJECTION, SOLUTION INTRAMUSCULAR; INTRAVENOUS
COMMUNITY

## 2017-03-07 RX ORDER — TRAMADOL HYDROCHLORIDE 50 MG/1
50 TABLET ORAL
Qty: 10 TAB | Refills: 0 | Status: SHIPPED | OUTPATIENT
Start: 2017-03-07

## 2017-03-07 RX ORDER — TAMSULOSIN HYDROCHLORIDE 0.4 MG/1
0.4 CAPSULE ORAL
Status: COMPLETED | OUTPATIENT
Start: 2017-03-07 | End: 2017-03-07

## 2017-03-07 RX ADMIN — TAMSULOSIN HYDROCHLORIDE 0.4 MG: 0.4 CAPSULE ORAL at 17:43

## 2017-03-07 RX ADMIN — TRAMADOL HYDROCHLORIDE 50 MG: 50 TABLET, FILM COATED ORAL at 19:56

## 2017-03-07 NOTE — ED PROVIDER NOTES
HPI Comments: 80 y.o. male with past medical history significant for Hypertension, CAD, BCC, Dementia, and Sciatics who presents from [address] with chief complaint of Abdominal Pain. Patient is a difficult historian. Per EMS, Nursing Staff reported patient had onset \"a few hours\" prior to arrival of right sided abdominal and right flank pain. Pt states some self relieving symptoms since onset. Pt states accompanying nausea. Patient states previous history of symptoms similar to those presented today in ED  \"a couple of months ago\", however pt states symptoms presented today are worse. Pt denies previous history of Kidney stones or previous Urology follow up. Pt has a previous history of Dementia. Pt is alert and orientated to self only. There are no other acute medical concerns at this time. PCP: Ivanna Casanova MD    Note written by Sindi Jacob, as dictated by Daralene Mcardle, MD 3:37 PM    The history is provided by the patient. History limited by: Dementia. Past Medical History:   Diagnosis Date    BCC (basal cell carcinoma of skin)     BPH (benign prostatic hypertrophy)     CAD (coronary artery disease)     3 stents and 1 baloon    Dementia     Hypertension     Other and unspecified hyperlipidemia     Overactive bladder     Pacemaker     Psychiatric disorder     Sciatica        Past Surgical History:   Procedure Laterality Date    CARDIAC SURG PROCEDURE UNLIST      stent placement    HX PACEMAKER           Family History:   Problem Relation Age of Onset    Cancer Father        Social History     Social History    Marital status:      Spouse name: N/A    Number of children: N/A    Years of education: N/A     Occupational History    Not on file.      Social History Main Topics    Smoking status: Former Smoker    Smokeless tobacco: Never Used    Alcohol use Yes      Comment: occasional beer/wine    Drug use: No    Sexual activity: No     Other Topics Concern  Not on file     Social History Narrative         ALLERGIES: Ciprofloxacin; Penicillins; Sulfa (sulfonamide antibiotics); and Vancomycin    Review of Systems   Unable to perform ROS: Dementia   Gastrointestinal: Positive for abdominal pain and nausea. Vitals:    03/07/17 1513 03/07/17 1515 03/07/17 1530 03/07/17 1545   BP: 147/56 155/65 137/66 141/70   Pulse: 74      Resp: 18      Temp: 97.4 °F (36.3 °C)      SpO2: 92% 96% 99% 97%   Weight: 62.1 kg (137 lb)      Height: 5' 11\" (1.803 m)               Physical Exam   Constitutional: He is oriented to person, place, and time. He appears well-developed and well-nourished. No distress. NAD, AxOx4, speaking in complete sentences     HENT:   Head: Normocephalic and atraumatic. Nose: Nose normal.   Mouth/Throat: Oropharynx is clear and moist.   Eyes: Conjunctivae and EOM are normal. Pupils are equal, round, and reactive to light. Right eye exhibits no discharge. Left eye exhibits no discharge. Neck: Normal range of motion. Neck supple. Cardiovascular: Normal rate, regular rhythm, normal heart sounds and intact distal pulses. Exam reveals no gallop and no friction rub. No murmur heard. Pulmonary/Chest: Effort normal and breath sounds normal. No respiratory distress. He has no wheezes. He has no rales. He exhibits no tenderness. Abdominal: Soft. Bowel sounds are normal. He exhibits no distension and no mass. There is no tenderness. There is no rebound and no guarding. nttp     Genitourinary:   Genitourinary Comments: Pt denies urinary/ Testicular/ scrotal or penile  complaints   Musculoskeletal: Normal range of motion. He exhibits no edema. Lymphadenopathy:     He has no cervical adenopathy. Neurological: He is alert and oriented to person, place, and time. He has normal reflexes. No cranial nerve deficit. Coordination normal.   Skin: Skin is warm and dry. No rash noted. No erythema. Psychiatric: He has a normal mood and affect.    Nursing note and vitals reviewed. [facility]    ED Course       Procedures     Chief Complaint   Patient presents with    Abdominal Pain    Flank Pain       4:41 PM  The patients presenting problems have been discussed, and they are in agreement with the care plan formulated and outlined with them. I have encouraged them to ask questions as they arise throughout their visit. MEDICATIONS GIVEN:  Medications   tamsulosin (FLOMAX) capsule 0.4 mg (0.4 mg Oral Given 3/7/17 9303)       LABS REVIEWED:  Labs Reviewed   CBC WITH AUTOMATED DIFF - Abnormal; Notable for the following:        Result Value    RBC 3.19 (*)     HGB 9.9 (*)     HCT 31.2 (*)     RDW 15.1 (*)     EOSINOPHILS 14 (*)     ABS. EOSINOPHILS 1.2 (*)     All other components within normal limits   METABOLIC PANEL, COMPREHENSIVE - Abnormal; Notable for the following:     Glucose 107 (*)     BUN 23 (*)     GFR est non-AA 55 (*)     Albumin 3.2 (*)     A-G Ratio 0.8 (*)     All other components within normal limits   URINALYSIS W/MICROSCOPIC - Abnormal; Notable for the following:     Protein TRACE (*)     Blood LARGE (*)     Leukocyte Esterase MODERATE (*)     All other components within normal limits   PROTHROMBIN TIME + INR - Abnormal; Notable for the following:     Prothrombin time 11.2 (*)     All other components within normal limits   SAMPLES BEING HELD   LIPASE   TROPONIN I       RADIOLOGY RESULTS:  The following have been ordered and reviewed:  _____________________________________________________________________  _____________________________________________________________________        PROCEDURES:        CONSULTATIONS:       PROGRESS NOTES:      DIAGNOSIS:    1. Ureter, calculus        PLAN:  1- R UPJ stone; pt feels better/ agrees w/ plans;       ED COURSE: The patients hospital course has been uncomplicated.     ED EKG interpretation:  Rhythm: 76 bpm Atrial-sensed ventricular-paced rhythm with prolonged AV conduction/ Negative acute significant segmental elevations; Note written by Sindi Marina, as dictated by Brad Griffith MD 3:55 PM     PROGRESS NOTE:4:13 PM  Lipase 89    Troponin negative    PROGRESS NOTE:5:22 PM  CT shows Mild right hydroureteronephrosis due to a 5 x 7 mm stone at the right ureterovesical junction. Will consult Urology    CONSULT NOTE:  Nelly Mcbride MD spoke with Consult for Urology, Dr. Pilar Mcnally . Discussed available diagnostic tests and clinical findings. He is in agreement with care plans as outlined. Urologist, thinks patient will pass stone on his own. Recommends pain control and discharge Home. PROGRESS NOTE:6:52 PM  Patient states he is feeling better and is agreeable to plan of discharge. 6:55 PM  Colin Richardsonn's  results have been reviewed with him. He has been counseled regarding his diagnosis. He verbally conveys understanding and agreement of the signs, symptoms, diagnosis, treatment and prognosis and additionally agrees to Call/ Arrange follow up as recommended with Dr. Tolbert Galeazzi, MD in 24 - 48 hours. He also agrees with the care-plan and conveys that all of his questions have been answered. I have also put together some discharge instructions for him that include: 1) educational information regarding their diagnosis, 2) how to care for their diagnosis at home, as well a 3) list of reasons why they would want to return to the ED prior to their follow-up appointment, should their condition change or for concerns.

## 2017-03-07 NOTE — DISCHARGE INSTRUCTIONS
Kidney Stone: Care Instructions  Your Care Instructions    Kidney stones are formed when salts, minerals, and other substances normally found in the urine clump together. They can be as small as grains of sand or, rarely, as large as golf balls. While the stone is traveling through the ureter, which is the tube that carries urine from the kidney to the bladder, you will probably feel pain. The pain may be mild or very severe. You may also have some blood in your urine. As soon as the stone reaches the bladder, any intense pain should go away. If a stone is too large to pass on its own, you may need a medical procedure to help you pass the stone. The doctor has checked you carefully, but problems can develop later. If you notice any problems or new symptoms, get medical treatment right away. Follow-up care is a key part of your treatment and safety. Be sure to make and go to all appointments, and call your doctor if you are having problems. It's also a good idea to know your test results and keep a list of the medicines you take. How can you care for yourself at home? · Drink plenty of fluids, enough so that your urine is light yellow or clear like water. If you have kidney, heart, or liver disease and have to limit fluids, talk with your doctor before you increase the amount of fluids you drink. · Take pain medicines exactly as directed. Call your doctor if you think you are having a problem with your medicine. ¨ If the doctor gave you a prescription medicine for pain, take it as prescribed. ¨ If you are not taking a prescription pain medicine, ask your doctor if you can take an over-the-counter medicine. Read and follow all instructions on the label. · Your doctor may ask you to strain your urine so that you can collect your kidney stone when it passes. You can use a kitchen strainer or a tea strainer to catch the stone. Store it in a plastic bag until you see your doctor again.   Preventing future kidney stones  Some changes in your diet may help prevent kidney stones. Depending on the cause of your stones, your doctor may recommend that you:  · Drink plenty of fluids, enough so that your urine is light yellow or clear like water. If you have kidney, heart, or liver disease and have to limit fluids, talk with your doctor before you increase the amount of fluids you drink. · Limit coffee, tea, and alcohol. Also avoid grapefruit juice. · Do not take more than the recommended daily dose of vitamins C and D.  · Avoid antacids such as Gaviscon, Maalox, Mylanta, or Tums. · Limit the amount of salt (sodium) in your diet. · Eat a balanced diet that is not too high in protein. · Limit foods that are high in a substance called oxalate, which can cause kidney stones. These foods include dark green vegetables, rhubarb, chocolate, wheat bran, nuts, cranberries, and beans. When should you call for help? Call your doctor now or seek immediate medical care if:  · You cannot keep down fluids. · Your pain gets worse. · You have a fever or chills. · You have new or worse pain in your back just below your rib cage (the flank area). · You have new or more blood in your urine. Watch closely for changes in your health, and be sure to contact your doctor if:  · You do not get better as expected. Where can you learn more? Go to http://roderick-laney.info/. Enter P718 in the search box to learn more about \"Kidney Stone: Care Instructions. \"  Current as of: November 20, 2015  Content Version: 11.1  © 1579-3038 [employer]. Care instructions adapted under license by [employer] (which disclaims liability or warranty for this information). If you have questions about a medical condition or this instruction, always ask your healthcare professional. [address] any warranty or liability for your use of this information.            We hope that we have addressed all of your medical concerns. The examination and treatment you received in the Emergency Department were for an emergent problem and were not intended as complete care. It is important that you follow up with your healthcare provider(s) for ongoing care. If your symptoms worsen or do not improve as expected, and you are unable to reach your usual health care provider(s), you should return to the Emergency Department. Today's healthcare is undergoing tremendous change, and patient satisfaction surveys are one of the many tools to assess the quality of medical care. You may receive a survey from the [employer] regarding your experience in the Emergency Department. I hope that your experience has been completely positive, particularly the medical care that I provided. As such, please participate in the survey; anything less than excellent does not meet my expectations or intentions. [facility] and [address] [place] [address] participate in nationally recognized quality of care measures. If your blood pressure is greater than 120/80, as reported below, we urge that you seek medical care to address the potential of high blood pressure, commonly known as hypertension. Hypertension can be hereditary or can be caused by certain medical conditions, pain, stress, or \"white coat syndrome. \"       Please make an appointment with your health care provider(s) for follow up of your Emergency Department visit. VITALS:   Patient Vitals for the past 8 hrs:   Temp Pulse Resp BP SpO2   03/07/17 1545 - - - 141/70 97 %   03/07/17 1530 - - - 137/66 99 %   03/07/17 1515 - - - 155/65 96 %   03/07/17 1513 97.4 °F (36.3 °C) 74 18 147/56 92 %          Thank you for allowing us to provide you with medical care today. We realize that you have many choices for your emergency care needs. Please choose us in the future for any continued health care needs. Paras Sharma [address], [address] [facility].   Office: [phone]            Recent Results (from the past 24 hour(s))   CBC WITH AUTOMATED DIFF    Collection Time: 03/07/17  3:16 PM   Result Value Ref Range    WBC 8.9 4.1 - 11.1 K/uL    RBC 3.19 (L) 4.10 - 5.70 M/uL    HGB 9.9 (L) 12.1 - 17.0 g/dL    HCT 31.2 (L) 36.6 - 50.3 %    MCV 97.8 80.0 - 99.0 FL    MCH 31.0 26.0 - 34.0 PG    MCHC 31.7 30.0 - 36.5 g/dL    RDW 15.1 (H) 11.5 - 14.5 %    PLATELET 180 836 - 408 K/uL    NEUTROPHILS 68 32 - 75 %    LYMPHOCYTES 12 12 - 49 %    MONOCYTES 6 5 - 13 %    EOSINOPHILS 14 (H) 0 - 7 %    BASOPHILS 0 0 - 1 %    ABS. NEUTROPHILS 6.1 1.8 - 8.0 K/UL    ABS. LYMPHOCYTES 1.1 0.8 - 3.5 K/UL    ABS. MONOCYTES 0.5 0.0 - 1.0 K/UL    ABS. EOSINOPHILS 1.2 (H) 0.0 - 0.4 K/UL    ABS. BASOPHILS 0.0 0.0 - 0.1 K/UL    DF SMEAR SCANNED      RBC COMMENTS NORMOCYTIC, NORMOCHROMIC     METABOLIC PANEL, COMPREHENSIVE    Collection Time: 03/07/17  3:16 PM   Result Value Ref Range    Sodium 137 136 - 145 mmol/L    Potassium 4.0 3.5 - 5.1 mmol/L    Chloride 102 97 - 108 mmol/L    CO2 29 21 - 32 mmol/L    Anion gap 6 5 - 15 mmol/L    Glucose 107 (H) 65 - 100 mg/dL    BUN 23 (H) 6 - 20 MG/DL    Creatinine 1.22 0.70 - 1.30 MG/DL    BUN/Creatinine ratio 19 12 - 20      GFR est AA >60 >60 ml/min/1.73m2    GFR est non-AA 55 (L) >60 ml/min/1.73m2    Calcium 8.8 8.5 - 10.1 MG/DL    Bilirubin, total 0.3 0.2 - 1.0 MG/DL    ALT (SGPT) 16 12 - 78 U/L    AST (SGOT) 16 15 - 37 U/L    Alk.  phosphatase 67 45 - 117 U/L    Protein, total 7.0 6.4 - 8.2 g/dL    Albumin 3.2 (L) 3.5 - 5.0 g/dL    Globulin 3.8 2.0 - 4.0 g/dL    A-G Ratio 0.8 (L) 1.1 - 2.2     LIPASE    Collection Time: 03/07/17  3:16 PM   Result Value Ref Range    Lipase 89 73 - 393 U/L   PROTHROMBIN TIME + INR    Collection Time: 03/07/17  3:16 PM   Result Value Ref Range    INR 1.1 0.9 - 1.1      Prothrombin time 11.2 (H) 9.0 - 11.1 sec   TROPONIN I    Collection Time: 03/07/17  3:16 PM   Result Value Ref Range    Troponin-I, Qt. <0.04 <0.05 ng/mL   EKG, 12 LEAD, INITIAL    Collection Time: 03/07/17  3:54 PM   Result Value Ref Range    Ventricular Rate 76 BPM    Atrial Rate 76 BPM    P-R Interval 210 ms    QRS Duration 150 ms    Q-T Interval 430 ms    QTC Calculation (Bezet) 483 ms    Calculated P Axis 47 degrees    Calculated R Axis -69 degrees    Calculated T Axis 98 degrees    Diagnosis       Atrial-sensed ventricular-paced rhythm with prolonged AV conduction  When compared with ECG of 28-JAN-2017 22:40,  Vent. rate has decreased BY  18 BPM     URINALYSIS W/MICROSCOPIC    Collection Time: 03/07/17  4:52 PM   Result Value Ref Range    Color YELLOW/STRAW      Appearance CLEAR CLEAR      Specific gravity 1.008 1.003 - 1.030      pH (UA) 7.5 5.0 - 8.0      Protein TRACE (A) NEG mg/dL    Glucose NEGATIVE  NEG mg/dL    Ketone NEGATIVE  NEG mg/dL    Bilirubin NEGATIVE  NEG      Blood LARGE (A) NEG      Urobilinogen 0.2 0.2 - 1.0 EU/dL    Nitrites NEGATIVE  NEG      Leukocyte Esterase MODERATE (A) NEG      WBC 5-10 0 - 4 /hpf    RBC 10-20 0 - 5 /hpf    Epithelial cells FEW FEW /lpf    Bacteria NEGATIVE  NEG /hpf       Ct Abd Pelv Wo Cont    Result Date: 3/7/2017  INDICATION: R flank/ RLQ abd pain COMPARISON: 1/28/2017 TECHNIQUE: Thin axial images were obtained through the abdomen and pelvis. Coronal and sagittal reconstructions were generated. Oral contrast was not administered. CT dose reduction was achieved through use of a standardized protocol tailored for this examination and automatic exposure control for dose modulation. The absence of intravenous contrast material reduces the sensitivity for evaluation of the solid parenchymal organs of the abdomen. 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 INCIDENTALLY IMAGED HEART AND MEDIASTINUM: Coronary artery stents. Pacemaker. LIVER: Unchanged subcentimeter cyst in the dome of the liver. No significant findings. GALLBLADDER: Unremarkable. SPLEEN: No mass. PANCREAS: No mass or ductal dilatation. ADRENALS: Unremarkable. KIDNEYS/URETERS: Mild right hydroureteronephrosis due to a 5 x 7 mm stone at the right ureterovesical junction, projecting into the urinary bladder. Right renal cyst not clearly demonstrated on this noncontrast study. Renal vascular calcification, particularly on the right. STOMACH: Small to moderate-sized hiatus hernia. SMALL BOWEL: No dilatation or wall thickening. COLON: Diverticulosis, particularly in the sigmoid colon. No CT evidence of acute diverticulitis. APPENDIX: Not definitely identified but not dilated. PERITONEUM: No ascites or pneumoperitoneum. RETROPERITONEUM: No lymphadenopathy or aortic aneurysm. Atherosclerotic aorta. REPRODUCTIVE ORGANS: Mild prostatic enlargement. Scattered areas of calcification. URINARY BLADDER: 5 x 7 mm stone at the right ureterovesical junction causing right ureteral obstruction. Otherwise negative. BONES: No destructive bone lesion. Degenerative changes in the spine. Degenerative changes in the SI joints. Dystrophic calcifications in the gluteal regions. ADDITIONAL COMMENTS: N/A     IMPRESSION: Mild right hydroureteronephrosis due to a 5 x 7 mm stone at the right ureterovesical junction.

## 2017-03-08 LAB
ATRIAL RATE: 76 BPM
CALCULATED P AXIS, ECG09: 47 DEGREES
CALCULATED R AXIS, ECG10: -69 DEGREES
CALCULATED T AXIS, ECG11: 98 DEGREES
DIAGNOSIS, 93000: NORMAL
P-R INTERVAL, ECG05: 210 MS
Q-T INTERVAL, ECG07: 430 MS
QRS DURATION, ECG06: 150 MS
QTC CALCULATION (BEZET), ECG08: 483 MS
VENTRICULAR RATE, ECG03: 76 BPM

## 2017-03-08 NOTE — CALL BACK NOTE
[facility] Emergency Department Follow Up Call Record    Discharged to : Home/Family Home/Home Health/Skilled Facility/Rehab/Assisted Living/Other  [place]_____  1) Did you receive your discharge instructions? Yes        2) Do you understand them? Yes    Spoke with nursing concerning Mr. Ashia Posey . They report Mr. Will Betancur doing better today. Received the discharge instructions and report. He is taking pain medication at this time. 3) Are you able to follow them? Yes          If NO, what can I clarify for you? 4) Do you understand your diagnosis? Yes         5) Do you know which symptoms should prompt you to call the doctor? Yes     6) Were you able to fill and  any medications that were prescribed? Yes     7) You were prescribed __tramadol,zofran_________for _ abdominal pain___________________. Common side effects of this medication are___rash, drowsiness_________________. This is not a complete list so please review the forms given from the pharmacy for a complete list.      8) Are there any questions about your medications? No            Have you scheduled any recommended doctors appointments (specialty, PCP) YES. Will be following up with his Urologist . If NO, what barriers are you encountering (transportation/lost contact info/cost/  didnt think necessary/no PCP  9) If discharged with Home Health, has the agency contacted you to schedule visit? Not applicable  10) Is there anyone available to help you at home (meals, errands, transportation    monitoring) (adult children, neighbors, private duty companions) Yes    11) Are you on a special diet? No         If YES, do you understand the requirements for this diet? Education provided? 12) If presented with cough, bronchitis, COPD, asthma, is it ok to ask that the   respiratory disease management educator call you?  Not applicable      13)  A) If presented with fall, were you issued an assistive device in the ED    Are you using? Not applicable  B) If given RX for device, have you obtained? Not applicable       If NO, barriers? C) Therapist recommended: No   Are you able to implement the suggestions? Not applicable        If NO, barriers to implementation? D) Are you having any difficulties with mobility inside your home?     (steps, bed, tub)No   If YES, ask if the [facility] PT can contact patient and good time and number?  14)  At the end of your discharge instructions, there is information about accessing [facility] SERVICES, have you had a chance to review those? Yes         Do you have any questions about signing up for this service? We encourage our patients to be active participants in their healthcare and this site is one of the ways to do that. It will allow you to access parts of your medical record, email your doctors office, schedule appointments, and request medications refills . 15) Are there any other questions that I can answer for you regarding    your Emergency department visit?  NO             Estimated Call Time:_____2:55 PM  ______________ Date/Time:_______________